# Patient Record
Sex: MALE | Race: WHITE | NOT HISPANIC OR LATINO
[De-identification: names, ages, dates, MRNs, and addresses within clinical notes are randomized per-mention and may not be internally consistent; named-entity substitution may affect disease eponyms.]

---

## 2017-07-10 PROBLEM — Z00.00 ENCOUNTER FOR PREVENTIVE HEALTH EXAMINATION: Status: ACTIVE | Noted: 2017-07-10

## 2017-08-08 ENCOUNTER — APPOINTMENT (OUTPATIENT)
Dept: NEPHROLOGY | Facility: CLINIC | Age: 37
End: 2017-08-08
Payer: COMMERCIAL

## 2017-08-08 VITALS
OXYGEN SATURATION: 98 % | WEIGHT: 159.2 LBS | BODY MASS INDEX: 25.58 KG/M2 | DIASTOLIC BLOOD PRESSURE: 81 MMHG | SYSTOLIC BLOOD PRESSURE: 117 MMHG | HEIGHT: 66 IN | HEART RATE: 67 BPM

## 2017-08-08 DIAGNOSIS — Z87.09 PERSONAL HISTORY OF OTHER DISEASES OF THE RESPIRATORY SYSTEM: ICD-10-CM

## 2017-08-08 DIAGNOSIS — N02.8 RECURRENT AND PERSISTENT HEMATURIA WITH OTHER MORPHOLOGIC CHANGES: ICD-10-CM

## 2017-08-08 DIAGNOSIS — Z83.49 FAMILY HISTORY OF OTHER ENDOCRINE, NUTRITIONAL AND METABOLIC DISEASES: ICD-10-CM

## 2017-08-08 DIAGNOSIS — I10 ESSENTIAL (PRIMARY) HYPERTENSION: ICD-10-CM

## 2017-08-08 DIAGNOSIS — R80.9 PROTEINURIA, UNSPECIFIED: ICD-10-CM

## 2017-08-08 DIAGNOSIS — Z87.2 PERSONAL HISTORY OF DISEASES OF THE SKIN AND SUBCUTANEOUS TISSUE: ICD-10-CM

## 2017-08-08 PROCEDURE — 99245 OFF/OP CONSLTJ NEW/EST HI 55: CPT

## 2017-08-09 PROBLEM — Z87.09 HISTORY OF ASTHMA: Status: RESOLVED | Noted: 2017-08-09 | Resolved: 2017-08-09

## 2017-08-09 PROBLEM — I10 BENIGN ESSENTIAL HYPERTENSION: Status: ACTIVE | Noted: 2017-08-09

## 2017-08-09 PROBLEM — Z87.2 HISTORY OF ECZEMA: Status: RESOLVED | Noted: 2017-08-09 | Resolved: 2017-08-09

## 2017-08-09 PROBLEM — Z83.49 FAMILY HISTORY OF THYROID DISEASE: Status: ACTIVE | Noted: 2017-08-09

## 2017-08-09 PROBLEM — N02.8 IGA NEPHROPATHY: Status: ACTIVE | Noted: 2017-08-09

## 2017-08-09 PROBLEM — R80.9 PROTEINURIA: Status: ACTIVE | Noted: 2017-08-09

## 2017-08-10 LAB
APPEARANCE: CLEAR
BACTERIA: NEGATIVE
BILIRUBIN URINE: NEGATIVE
BLOOD URINE: ABNORMAL
COLOR: YELLOW
CREAT SPEC-SCNC: 20 MG/DL
CREAT/PROT UR: 0.5 RATIO
GLUCOSE QUALITATIVE U: NORMAL MG/DL
KETONES URINE: NEGATIVE
LEUKOCYTE ESTERASE URINE: NEGATIVE
MICROSCOPIC-UA: NORMAL
NITRITE URINE: NEGATIVE
PH URINE: 7
PROT UR-MCNC: 9 MG/DL
PROTEIN URINE: NEGATIVE MG/DL
RED BLOOD CELLS URINE: 0 /HPF
SPECIFIC GRAVITY URINE: 1.01
SQUAMOUS EPITHELIAL CELLS: 0 /HPF
UROBILINOGEN URINE: NORMAL MG/DL
WHITE BLOOD CELLS URINE: 0 /HPF

## 2017-08-15 ENCOUNTER — RESULT REVIEW (OUTPATIENT)
Age: 37
End: 2017-08-15

## 2020-08-24 ENCOUNTER — APPOINTMENT (OUTPATIENT)
Dept: SPINE | Facility: CLINIC | Age: 40
End: 2020-08-24
Payer: COMMERCIAL

## 2020-08-24 VITALS
RESPIRATION RATE: 18 BRPM | BODY MASS INDEX: 1.6 KG/M2 | TEMPERATURE: 97 F | HEIGHT: 66 IN | HEART RATE: 89 BPM | WEIGHT: 9.94 LBS | OXYGEN SATURATION: 98 % | SYSTOLIC BLOOD PRESSURE: 120 MMHG | DIASTOLIC BLOOD PRESSURE: 70 MMHG

## 2020-08-24 DIAGNOSIS — Z87.39 PERSONAL HISTORY OF OTHER DISEASES OF THE MUSCULOSKELETAL SYSTEM AND CONNECTIVE TISSUE: ICD-10-CM

## 2020-08-24 DIAGNOSIS — Z82.69 FAMILY HISTORY OF OTHER DISEASES OF THE MUSCULOSKELETAL SYSTEM AND CONNECTIVE TISSUE: ICD-10-CM

## 2020-08-24 DIAGNOSIS — Z78.9 OTHER SPECIFIED HEALTH STATUS: ICD-10-CM

## 2020-08-24 PROCEDURE — 99203 OFFICE O/P NEW LOW 30 MIN: CPT

## 2020-08-24 RX ORDER — SPIRONOLACTONE 50 MG/1
TABLET ORAL
Refills: 0 | Status: ACTIVE | COMMUNITY

## 2020-08-24 RX ORDER — LAMOTRIGINE 150 MG/1
150 TABLET ORAL
Refills: 0 | Status: ACTIVE | COMMUNITY

## 2020-08-24 NOTE — PHYSICAL EXAM
[Place] : oriented to place [Time] : oriented to time [Person] : oriented to person [Remote Intact] : remote memory intact [Short Term Intact] : short term memory intact [Span Intact] : the attention span was normal [Fluency] : fluency intact [Concentration Intact] : normal concentrating ability [Comprehension] : comprehension intact [Past History] : adequate knowledge of personal past history [Vocabulary] : adequate range of vocabulary [Current Events] : adequate knowledge of current events [Cranial Nerves Trigeminal (V)] : facial sensation intact symmetrically [Cranial Nerves Oculomotor (III)] : extraocular motion intact [Cranial Nerves Optic (II)] : visual acuity intact bilaterally,  pupils equal round and reactive to light [Cranial Nerves Vestibulocochlear (VIII)] : hearing was intact bilaterally [Cranial Nerves Facial (VII)] : face symmetrical [Cranial Nerves Accessory (XI - Cranial And Spinal)] : head turning and shoulder shrug symmetric [Cranial Nerves Hypoglossal (XII)] : there was no tongue deviation with protrusion [Cranial Nerves Glossopharyngeal (IX)] : tongue and palate midline [No Muscle Atrophy] : normal bulk in all four extremities [Motor Strength] : muscle strength was normal in all four extremities [Motor Tone] : muscle tone was normal in all four extremities [Sensation Tactile Decrease] : light touch was intact [Abnormal Walk] : normal gait [Balance] : balance was intact [Tremor] : no tremor present [Past-pointing] : there was no past-pointing [1+] : Ankle jerk left 1+ [2+] : Patella left 2+ [Barbosa] : Barbosa's sign was not demonstrated [Straight-Leg Raise Test - Left] : straight leg raise of the left leg was positive [Able to heel walk] : the patient was able to heel walk [Straight-Leg Raise Test - Right] : straight leg raise  of the right leg was negative [Able to toe walk] : the patient was able to toe walk

## 2020-08-24 NOTE — REASON FOR VISIT
[New Patient Visit] : a new patient visit [Other: _____] : [unfilled] [Referred By: _________] : Patient was referred by JANE

## 2020-08-24 NOTE — HISTORY OF PRESENT ILLNESS
[> 3 months] : more  than 3 months [FreeTextEntry1] : Lumbar radiculopathy  [de-identified] : 40 year old male with lower back herniated discs diagnosed about five years ago and in 2018 the pain began to increase.  Over the last six months his pain is progressing.  He took prednisone and did attend PT prior to COVID pandemic and he had to stop PT.  A total of 5 weeks of physical therapy.   He has severe pain and difficulty sleeping.  He has left sided electric shocks into his back and then  muscle spasms from his left side of his buttock and left thigh posteriorly.  He has burning of his thigh and numbness of his left great toe.  He does have trouble walking only when he rises from laying down and transitioning.  He has no weakness.   He did have one epidural injection that was self limiting. An MRI report describes a left L4-5 and a left L5-S1 disc herniation. The CD contains no images.

## 2020-08-24 NOTE — ASSESSMENT
[FreeTextEntry1] : 40 year old male with a history of lower back pain and left thigh pain with known lumbar herniated disc.  He will undergo a trial of epidural injections and return if the pain is not relieved.   The MRI disc had no DICOM images and he will obtain an updated MRI if his pain is not alleviated by the epidural injections.

## 2020-08-27 PROBLEM — M54.16 LUMBAR RADICULOPATHY: Status: ACTIVE | Noted: 2020-08-24

## 2020-08-30 ENCOUNTER — TRANSCRIPTION ENCOUNTER (OUTPATIENT)
Age: 40
End: 2020-08-30

## 2020-08-30 ENCOUNTER — INPATIENT (INPATIENT)
Facility: HOSPITAL | Age: 40
LOS: 1 days | Discharge: HOME | DRG: 519 | End: 2020-09-01
Attending: NEUROLOGICAL SURGERY | Admitting: INTERNAL MEDICINE
Payer: COMMERCIAL

## 2020-08-30 VITALS
OXYGEN SATURATION: 96 % | TEMPERATURE: 98 F | RESPIRATION RATE: 18 BRPM | HEIGHT: 66 IN | WEIGHT: 149.91 LBS | SYSTOLIC BLOOD PRESSURE: 124 MMHG | HEART RATE: 114 BPM | DIASTOLIC BLOOD PRESSURE: 85 MMHG

## 2020-08-30 DIAGNOSIS — M54.9 DORSALGIA, UNSPECIFIED: ICD-10-CM

## 2020-08-30 LAB
ALBUMIN SERPL ELPH-MCNC: 4.3 G/DL — SIGNIFICANT CHANGE UP (ref 3.3–5)
ALP SERPL-CCNC: 46 U/L — SIGNIFICANT CHANGE UP (ref 40–120)
ALT FLD-CCNC: 18 U/L — SIGNIFICANT CHANGE UP (ref 10–45)
ANION GAP SERPL CALC-SCNC: 11 MMOL/L — SIGNIFICANT CHANGE UP (ref 5–17)
APPEARANCE UR: CLEAR — SIGNIFICANT CHANGE UP
AST SERPL-CCNC: 16 U/L — SIGNIFICANT CHANGE UP (ref 10–40)
BACTERIA # UR AUTO: NEGATIVE — SIGNIFICANT CHANGE UP
BASOPHILS # BLD AUTO: 0.01 K/UL — SIGNIFICANT CHANGE UP (ref 0–0.2)
BASOPHILS NFR BLD AUTO: 0.1 % — SIGNIFICANT CHANGE UP (ref 0–2)
BILIRUB SERPL-MCNC: 0.9 MG/DL — SIGNIFICANT CHANGE UP (ref 0.2–1.2)
BILIRUB UR-MCNC: NEGATIVE — SIGNIFICANT CHANGE UP
BUN SERPL-MCNC: 14 MG/DL — SIGNIFICANT CHANGE UP (ref 7–23)
CALCIUM SERPL-MCNC: 9.3 MG/DL — SIGNIFICANT CHANGE UP (ref 8.4–10.5)
CHLORIDE SERPL-SCNC: 106 MMOL/L — SIGNIFICANT CHANGE UP (ref 96–108)
CO2 SERPL-SCNC: 25 MMOL/L — SIGNIFICANT CHANGE UP (ref 22–31)
COLOR SPEC: SIGNIFICANT CHANGE UP
CREAT SERPL-MCNC: 0.83 MG/DL — SIGNIFICANT CHANGE UP (ref 0.5–1.3)
DIFF PNL FLD: ABNORMAL
EOSINOPHIL # BLD AUTO: 0.03 K/UL — SIGNIFICANT CHANGE UP (ref 0–0.5)
EOSINOPHIL NFR BLD AUTO: 0.3 % — SIGNIFICANT CHANGE UP (ref 0–6)
EPI CELLS # UR: 0 /HPF — SIGNIFICANT CHANGE UP
GLUCOSE SERPL-MCNC: 130 MG/DL — HIGH (ref 70–99)
GLUCOSE UR QL: NEGATIVE — SIGNIFICANT CHANGE UP
HCT VFR BLD CALC: 45.6 % — SIGNIFICANT CHANGE UP (ref 39–50)
HGB BLD-MCNC: 15.1 G/DL — SIGNIFICANT CHANGE UP (ref 13–17)
HYALINE CASTS # UR AUTO: 3 /LPF — HIGH (ref 0–2)
IMM GRANULOCYTES NFR BLD AUTO: 0.3 % — SIGNIFICANT CHANGE UP (ref 0–1.5)
KETONES UR-MCNC: NEGATIVE — SIGNIFICANT CHANGE UP
LEUKOCYTE ESTERASE UR-ACNC: NEGATIVE — SIGNIFICANT CHANGE UP
LYMPHOCYTES # BLD AUTO: 1.08 K/UL — SIGNIFICANT CHANGE UP (ref 1–3.3)
LYMPHOCYTES # BLD AUTO: 12.6 % — LOW (ref 13–44)
MCHC RBC-ENTMCNC: 27.9 PG — SIGNIFICANT CHANGE UP (ref 27–34)
MCHC RBC-ENTMCNC: 33.1 GM/DL — SIGNIFICANT CHANGE UP (ref 32–36)
MCV RBC AUTO: 84.3 FL — SIGNIFICANT CHANGE UP (ref 80–100)
MONOCYTES # BLD AUTO: 0.38 K/UL — SIGNIFICANT CHANGE UP (ref 0–0.9)
MONOCYTES NFR BLD AUTO: 4.4 % — SIGNIFICANT CHANGE UP (ref 2–14)
NEUTROPHILS # BLD AUTO: 7.07 K/UL — SIGNIFICANT CHANGE UP (ref 1.8–7.4)
NEUTROPHILS NFR BLD AUTO: 82.3 % — HIGH (ref 43–77)
NITRITE UR-MCNC: NEGATIVE — SIGNIFICANT CHANGE UP
NRBC # BLD: 0 /100 WBCS — SIGNIFICANT CHANGE UP (ref 0–0)
PH UR: 6.5 — SIGNIFICANT CHANGE UP (ref 5–8)
PLATELET # BLD AUTO: 236 K/UL — SIGNIFICANT CHANGE UP (ref 150–400)
POTASSIUM SERPL-MCNC: 4 MMOL/L — SIGNIFICANT CHANGE UP (ref 3.5–5.3)
POTASSIUM SERPL-SCNC: 4 MMOL/L — SIGNIFICANT CHANGE UP (ref 3.5–5.3)
PROT SERPL-MCNC: 6.4 G/DL — SIGNIFICANT CHANGE UP (ref 6–8.3)
PROT UR-MCNC: 100 — SIGNIFICANT CHANGE UP
RBC # BLD: 5.41 M/UL — SIGNIFICANT CHANGE UP (ref 4.2–5.8)
RBC # FLD: 12.3 % — SIGNIFICANT CHANGE UP (ref 10.3–14.5)
RBC CASTS # UR COMP ASSIST: 29 /HPF — HIGH (ref 0–4)
SODIUM SERPL-SCNC: 142 MMOL/L — SIGNIFICANT CHANGE UP (ref 135–145)
SP GR SPEC: 1.02 — SIGNIFICANT CHANGE UP (ref 1.01–1.02)
UROBILINOGEN FLD QL: NEGATIVE — SIGNIFICANT CHANGE UP
WBC # BLD: 8.6 K/UL — SIGNIFICANT CHANGE UP (ref 3.8–10.5)
WBC # FLD AUTO: 8.6 K/UL — SIGNIFICANT CHANGE UP (ref 3.8–10.5)
WBC UR QL: 1 /HPF — SIGNIFICANT CHANGE UP (ref 0–5)

## 2020-08-30 PROCEDURE — 72148 MRI LUMBAR SPINE W/O DYE: CPT | Mod: 26

## 2020-08-30 PROCEDURE — 99223 1ST HOSP IP/OBS HIGH 75: CPT

## 2020-08-30 PROCEDURE — 99285 EMERGENCY DEPT VISIT HI MDM: CPT

## 2020-08-30 RX ORDER — OXYCODONE HYDROCHLORIDE 5 MG/1
5 TABLET ORAL EVERY 4 HOURS
Refills: 0 | Status: DISCONTINUED | OUTPATIENT
Start: 2020-08-30 | End: 2020-08-31

## 2020-08-30 RX ORDER — DEXAMETHASONE 0.5 MG/5ML
4 ELIXIR ORAL EVERY 6 HOURS
Refills: 0 | Status: DISCONTINUED | OUTPATIENT
Start: 2020-08-30 | End: 2020-08-31

## 2020-08-30 RX ORDER — GABAPENTIN 400 MG/1
300 CAPSULE ORAL
Refills: 0 | Status: DISCONTINUED | OUTPATIENT
Start: 2020-08-30 | End: 2020-08-31

## 2020-08-30 RX ORDER — ACETAMINOPHEN 500 MG
975 TABLET ORAL ONCE
Refills: 0 | Status: COMPLETED | OUTPATIENT
Start: 2020-08-30 | End: 2020-08-30

## 2020-08-30 RX ORDER — SODIUM CHLORIDE 9 MG/ML
1000 INJECTION INTRAMUSCULAR; INTRAVENOUS; SUBCUTANEOUS ONCE
Refills: 0 | Status: COMPLETED | OUTPATIENT
Start: 2020-08-30 | End: 2020-08-30

## 2020-08-30 RX ORDER — DIAZEPAM 5 MG
5 TABLET ORAL ONCE
Refills: 0 | Status: DISCONTINUED | OUTPATIENT
Start: 2020-08-30 | End: 2020-08-30

## 2020-08-30 RX ORDER — SODIUM CHLORIDE 9 MG/ML
1000 INJECTION INTRAMUSCULAR; INTRAVENOUS; SUBCUTANEOUS ONCE
Refills: 0 | Status: DISCONTINUED | OUTPATIENT
Start: 2020-08-30 | End: 2020-08-30

## 2020-08-30 RX ORDER — SENNA PLUS 8.6 MG/1
2 TABLET ORAL AT BEDTIME
Refills: 0 | Status: DISCONTINUED | OUTPATIENT
Start: 2020-08-30 | End: 2020-08-31

## 2020-08-30 RX ORDER — OXYCODONE HYDROCHLORIDE 5 MG/1
5 TABLET ORAL ONCE
Refills: 0 | Status: DISCONTINUED | OUTPATIENT
Start: 2020-08-30 | End: 2020-08-30

## 2020-08-30 RX ORDER — ACETAMINOPHEN 500 MG
650 TABLET ORAL EVERY 6 HOURS
Refills: 0 | Status: DISCONTINUED | OUTPATIENT
Start: 2020-08-30 | End: 2020-08-31

## 2020-08-30 RX ORDER — CYCLOBENZAPRINE HYDROCHLORIDE 10 MG/1
5 TABLET, FILM COATED ORAL THREE TIMES A DAY
Refills: 0 | Status: DISCONTINUED | OUTPATIENT
Start: 2020-08-30 | End: 2020-08-31

## 2020-08-30 RX ORDER — LIDOCAINE 4 G/100G
1 CREAM TOPICAL ONCE
Refills: 0 | Status: COMPLETED | OUTPATIENT
Start: 2020-08-30 | End: 2020-08-30

## 2020-08-30 RX ADMIN — SODIUM CHLORIDE 1000 MILLILITER(S): 9 INJECTION INTRAMUSCULAR; INTRAVENOUS; SUBCUTANEOUS at 19:51

## 2020-08-30 RX ADMIN — OXYCODONE HYDROCHLORIDE 5 MILLIGRAM(S): 5 TABLET ORAL at 16:39

## 2020-08-30 RX ADMIN — Medication 975 MILLIGRAM(S): at 16:39

## 2020-08-30 RX ADMIN — OXYCODONE HYDROCHLORIDE 5 MILLIGRAM(S): 5 TABLET ORAL at 16:41

## 2020-08-30 RX ADMIN — Medication 5 MILLIGRAM(S): at 16:39

## 2020-08-30 RX ADMIN — Medication 975 MILLIGRAM(S): at 16:41

## 2020-08-30 RX ADMIN — LIDOCAINE 1 PATCH: 4 CREAM TOPICAL at 16:39

## 2020-08-30 NOTE — ED ADULT NURSE REASSESSMENT NOTE - NS ED NURSE REASSESS COMMENT FT1
Pt received from ANDRAE Ann in [place]. Pt remains in the ED. Resting comfortably in bed. VSS. NAD. AOx4. Breathing unlabored and spontaneously, sating 96% on room air. S1 and S2 heard. No peripheral edema. Peripheral pulses strong bilaterally. Abdomen soft, nontender and nondistended. Positive bowel sounds in all 4 quadrants. Pt safety maintained. Awaiting dispo. Will continue to monitor.

## 2020-08-30 NOTE — ED ADULT NURSE REASSESSMENT NOTE - NS ED NURSE REASSESS COMMENT FT1
Pt remains in the ED. Resting comfortably in bed. A&Ox3. Breathing spontaneously and unlabored. NAD. Pt given food and drink. Pt safety maintained. Will continue to monitor. Awaiting dispo.

## 2020-08-30 NOTE — CONSULT NOTE ADULT - ASSESSMENT
Sandeep Gustafson    40M PMH IgA nephropathy presents due to 6 weeks of worsening LBP with radiation down the left posterior leg, L5 distribution. Denies numbness, B/B incontinence, retention. Unable to walk due to pain. Failed PT/Steroid injections/oral steroids. No AC/AP. MRI shows     Exam: AAOx3, 5/5 throughout except L HF 4+/5 pain limited. SILT. SLR negative B/L, Jed negative B/L, no back tenderness to palp, no riggs, no clonus, Patellar reflex 2+ B/L. Had upcoming outpt appointment with Dr. Sony Barnett.  -Pain control  -Outside MRI does not have diagnostic sequences uploaded. Will need repeat MRI  -CT L-spine  Plan to be discussed with attending.

## 2020-08-30 NOTE — ED PROVIDER NOTE - OBJECTIVE STATEMENT
41yo male pt with PMHx of IgA nephropathy, chronic lower back pain presents to ED with worsening left sided back  pain, radiating to left leg since yesterday. He took Gabapentin/ medrol dose pack without improvement and had upcoming outpt appointment with Dr. Cardoza Tufernandoday. Denies recent injury. Denies fever, chills, cough or congestion. Denies headache, dizziness or neck pain. Denies CP/SOB/ABD pain or N/V/D. Denies urinary or bowel problems. Denies sensory changes or weakness to extremities.

## 2020-08-30 NOTE — ED PROVIDER NOTE - CLINICAL SUMMARY MEDICAL DECISION MAKING FREE TEXT BOX
Attending MD Santana: 40M with ho lumbar radiculopathy from herniated disc since 2/2020 presenting with acute on chronic low back pain and left leg pain. No red flags by history or exam to suggest impending cauda equina syndrome or spinal cord compression. Symptoms referable to known L spine herniated disc. Patient has 2nd opinion with Dr. Cardoza in 2 days. No indication for urgent spinal imaging through ED. Will pursue PO analgesia, patient already on PO steroids from his neurologist.

## 2020-08-30 NOTE — H&P ADULT - NSHPPHYSICALEXAM_GEN_ALL_CORE
Vital Signs Last 24 Hrs  T(C): 36.4 (30 Aug 2020 23:27), Max: 36.7 (30 Aug 2020 15:39)  T(F): 97.6 (30 Aug 2020 23:27), Max: 98.1 (30 Aug 2020 16:43)  HR: 63 (30 Aug 2020 23:27) (59 - 114)  BP: 116/77 (30 Aug 2020 23:27) (108/79 - 128/81)  BP(mean): --  RR: 18 (30 Aug 2020 23:27) (18 - 18)  SpO2: 95% (30 Aug 2020 23:27) (95% - 99%) Vital Signs Last 24 Hrs  T(C): 36.4 (30 Aug 2020 23:27), Max: 36.7 (30 Aug 2020 15:39)  T(F): 97.6 (30 Aug 2020 23:27), Max: 98.1 (30 Aug 2020 16:43)  HR: 63 (30 Aug 2020 23:27) (59 - 114)  BP: 116/77 (30 Aug 2020 23:27) (108/79 - 128/81)  BP(mean): --  RR: 18 (30 Aug 2020 23:27) (18 - 18)  SpO2: 95% (30 Aug 2020 23:27) (95% - 99%)      PHYSICAL EXAM:  GENERAL: NAD, well-groomed, well-developed  HEAD:  Atraumatic, Normocephalic  EYES: EOMI, PERRLA, conjunctiva and sclera clear  ENMT: No tonsillar erythema, exudates, or enlargement; Moist mucous membranes, Good dentition, No lesions  NECK: Supple, No JVD, Normal thyroid  NERVOUS SYSTEM:  Alert & Oriented X3, Good concentration; Motor Strength 5/5 B/L upper extremities 4/5 strength at hip flexion of LLE, otherwise 5/5 throughout in B/L lower extremities. Sensation to light touch intact B/L  CHEST/LUNG: Clear to percussion bilaterally; No rales, rhonchi, or wheezing  HEART: Regular rate and rhythm +S1 S2; No murmurs, rubs, or gallops  ABDOMEN: Soft, Nontender, Nondistended; Bowel sounds present  EXTREMITIES:  2+ Radial and DP pulses B/L. No clubbing, cyanosis, or edema  SKIN: Warm and dry

## 2020-08-30 NOTE — H&P ADULT - NSHPREVIEWOFSYSTEMS_GEN_ALL_CORE
REVIEW OF SYSTEMS:  CONSTITUTIONAL: No fever, chills or sweats  EYES: No eye pain or discharge  ENMT: No sinus or throat pain  RESPIRATORY: No cough or shortness of breath  CARDIOVASCULAR: No chest pain or palpitations,  GASTROINTESTINAL: No abdominal pain. No nausea, vomiting, or hematemesis; No diarrhea or constipation.  GENITOURINARY: No dysuria or incontinence  NEUROLOGICAL: No headaches, loss of strength, numbness, or tremors  SKIN: No rashes, or lesions   MUSCULOSKELETAL: SEE HPI  PSYCHIATRIC: No depression or anxiety  ALLERGY AND IMMUNOLOGIC: No reactions to medications

## 2020-08-30 NOTE — ED PROVIDER NOTE - ATTENDING CONTRIBUTION TO CARE
Attending MD Santana:  I personally have seen and examined this patient.  NP note reviewed and agree on plan of care and except where noted.  See HPI, PE, and MDM for details.

## 2020-08-30 NOTE — H&P ADULT - ATTENDING COMMENTS
Dr. Nilsa Vazquez accepted patient's case from the ED and requested in house hospitalist team to complete admission. Patient was previously unknown to me. Patient was assigned to me by hospitalist in charge. My involvement in this case consisted only of the initial history, physical and management plan. Dr. Vazquez/Janie to assume care in AM and thereafter. Case discussed in detail with overnight medicine NP/PA Dr. Nilsa Vazquez accepted patient's case from the ED and requested in house hospitalist team to complete admission. Patient was previously unknown to me. Patient was assigned to me by hospitalist in charge. My involvement in this case consisted only of the initial history, physical and management plan. Dr. Vazquez/Janie to assume care in AM and thereafter. Case discussed in detail with overnight medicine NP/ROXANNE Tafoya [id number]

## 2020-08-30 NOTE — CONSULT NOTE ADULT - SUBJECTIVE AND OBJECTIVE BOX
p ([id number])     HPI:  40M PMH IgA nephropathy presents due to 6 weeks of worsening LBP with radiation down the left posterior leg, L5 distribution. Denies numbness, B/B incontinence, retention. Unable to walk due to pain. Failed PT/Steroid injections/oral steroids. No AC/AP. MRI shows     Exam: AAOx3, 5/5 throughout except L HF 4+/5 pain limited. SILT. SLR negative B/L, Jed negative B/L, no back tenderness to palp, no riggs, no clonus, Patellar reflex 2+ B/L. Had upcoming outpt appointment with Dr. Sony Barnett.    Imaging: Outside MRI without complete sequences    --Anticoagulation:    =====================  PAST MEDICAL HISTORY   IGA nephropathy    PAST SURGICAL HISTORY     NSAIDs (Other)      MEDICATIONS:  Antibiotics:    Neuro:    Other:      SOCIAL HISTORY:   Occupation:   Marital Status:     FAMILY HISTORY:      ROS: Negative except per HPI    LABS:

## 2020-08-30 NOTE — H&P ADULT - HISTORY OF PRESENT ILLNESS
41 yo man w/ PMH of IgA nephropathy, chronic lower back pain in setting of herniated disc presenting with worsening Lower back pain where he has been unable to ambulate of the past 2 nights. Patient states that the back pain predominantly located on the lower left side of his back with radiation down to the left calf. Describes the pain as a strong burning sensation. Pain is most pronounced when he stands. The past day and the half, he had to crawl to get around. Denies focal limb weakness, denies paresthesias or loss of sensation. Denies bowel or urinary incontinence He typically takes Gabapentin 300 qhs but he took 2 doses without alleviation. He called his neurologist and was prescribed a Medrol dose pack ant took 4 tablets today. Both of which did not provide alleviation of symptoms. Reportedly has tried PT, steroid injection (last 3 weeks ago) has not provided total relief in the past. Patient has been battling chronic back pain since 2018 and has occasional flares. His chronic back pain has been present since Feb 2020 and progressively worse. His neurologist recommended him to see Dr. Cardoza for possible surgical intervention.

## 2020-08-30 NOTE — H&P ADULT - ASSESSMENT
41 yo man w/ PMH of IgA nephropathy, chronic lower back pain in setting of herniated disc presenting with worsening Lower back pain where he has been unable to ambulate of the past 2 nights.

## 2020-08-30 NOTE — ED PROVIDER NOTE - PHYSICAL EXAMINATION
NAD, Tachycardia, Afebrile, Lungs clear. ABD soft, non tender. No spinal tender. No CVA tender. No focal neuro deficit.

## 2020-08-30 NOTE — ED ADULT NURSE NOTE - OBJECTIVE STATEMENT
39 yo male complaining of back pain "I have a herniated disk for years and here and there I feel a lot of pain, yesterday I was going up from bed and I felt the pain and I could not even stand, I have to crawl to the bathroom and even since then I am in so much pain" Pt states he cannot walk, alerted and oriented x3, no sings of acute distress noted. Pt laying on stretcher pending on MD examination.

## 2020-08-30 NOTE — H&P ADULT - PROBLEM SELECTOR PLAN 1
Discussed MRI w/[facility]uro[facility] on call: Reviewed MRI: notable disc herniation along sacral levels.   Recommended continued use of steroids Decadron 4mg q6h  Will follow up full read and to discuss plan with Dr. Cardoza  Continue pain control with Tylenol, Oxycodone prn, Increase Gabapentin to 300 BID and Trial of Cyclobenzaprine.   Primary day team to re-eval pain regimen and escalate if needed  Fall risk protocol  OOB with assistance

## 2020-08-30 NOTE — ED PROVIDER NOTE - PROGRESS NOTE DETAILS
Neurosurgery ar bedside. No focal neuro deficit now but he is unable to stand or ambulate due to right back spasm.

## 2020-08-31 ENCOUNTER — APPOINTMENT (OUTPATIENT)
Dept: SPINE | Facility: HOSPITAL | Age: 40
End: 2020-08-31
Payer: COMMERCIAL

## 2020-08-31 DIAGNOSIS — N02.8 RECURRENT AND PERSISTENT HEMATURIA WITH OTHER MORPHOLOGIC CHANGES: ICD-10-CM

## 2020-08-31 DIAGNOSIS — Z29.9 ENCOUNTER FOR PROPHYLACTIC MEASURES, UNSPECIFIED: ICD-10-CM

## 2020-08-31 DIAGNOSIS — M54.9 DORSALGIA, UNSPECIFIED: ICD-10-CM

## 2020-08-31 DIAGNOSIS — Z98.890 OTHER SPECIFIED POSTPROCEDURAL STATES: Chronic | ICD-10-CM

## 2020-08-31 LAB
ANION GAP SERPL CALC-SCNC: 10 MMOL/L — SIGNIFICANT CHANGE UP (ref 5–17)
ANION GAP SERPL CALC-SCNC: 16 MMOL/L — SIGNIFICANT CHANGE UP (ref 5–17)
APTT BLD: 27.9 SEC — SIGNIFICANT CHANGE UP (ref 27.5–35.5)
APTT BLD: 28.7 SEC — SIGNIFICANT CHANGE UP (ref 27.5–35.5)
BASOPHILS # BLD AUTO: 0.01 K/UL — SIGNIFICANT CHANGE UP (ref 0–0.2)
BASOPHILS # BLD AUTO: 0.01 K/UL — SIGNIFICANT CHANGE UP (ref 0–0.2)
BASOPHILS NFR BLD AUTO: 0.1 % — SIGNIFICANT CHANGE UP (ref 0–2)
BASOPHILS NFR BLD AUTO: 0.1 % — SIGNIFICANT CHANGE UP (ref 0–2)
BLD GP AB SCN SERPL QL: NEGATIVE — SIGNIFICANT CHANGE UP
BUN SERPL-MCNC: 13 MG/DL — SIGNIFICANT CHANGE UP (ref 7–23)
BUN SERPL-MCNC: 14 MG/DL — SIGNIFICANT CHANGE UP (ref 7–23)
CALCIUM SERPL-MCNC: 8.7 MG/DL — SIGNIFICANT CHANGE UP (ref 8.4–10.5)
CALCIUM SERPL-MCNC: 8.9 MG/DL — SIGNIFICANT CHANGE UP (ref 8.4–10.5)
CHLORIDE SERPL-SCNC: 103 MMOL/L — SIGNIFICANT CHANGE UP (ref 96–108)
CHLORIDE SERPL-SCNC: 106 MMOL/L — SIGNIFICANT CHANGE UP (ref 96–108)
CO2 SERPL-SCNC: 23 MMOL/L — SIGNIFICANT CHANGE UP (ref 22–31)
CO2 SERPL-SCNC: 24 MMOL/L — SIGNIFICANT CHANGE UP (ref 22–31)
CREAT SERPL-MCNC: 0.86 MG/DL — SIGNIFICANT CHANGE UP (ref 0.5–1.3)
CREAT SERPL-MCNC: 1 MG/DL — SIGNIFICANT CHANGE UP (ref 0.5–1.3)
EOSINOPHIL # BLD AUTO: 0.01 K/UL — SIGNIFICANT CHANGE UP (ref 0–0.5)
EOSINOPHIL # BLD AUTO: 0.05 K/UL — SIGNIFICANT CHANGE UP (ref 0–0.5)
EOSINOPHIL NFR BLD AUTO: 0.1 % — SIGNIFICANT CHANGE UP (ref 0–6)
EOSINOPHIL NFR BLD AUTO: 0.5 % — SIGNIFICANT CHANGE UP (ref 0–6)
GLUCOSE SERPL-MCNC: 118 MG/DL — HIGH (ref 70–99)
GLUCOSE SERPL-MCNC: 126 MG/DL — HIGH (ref 70–99)
HCT VFR BLD CALC: 44 % — SIGNIFICANT CHANGE UP (ref 39–50)
HCT VFR BLD CALC: 45.5 % — SIGNIFICANT CHANGE UP (ref 39–50)
HGB BLD-MCNC: 14.1 G/DL — SIGNIFICANT CHANGE UP (ref 13–17)
HGB BLD-MCNC: 14.4 G/DL — SIGNIFICANT CHANGE UP (ref 13–17)
IMM GRANULOCYTES NFR BLD AUTO: 0.3 % — SIGNIFICANT CHANGE UP (ref 0–1.5)
IMM GRANULOCYTES NFR BLD AUTO: 0.6 % — SIGNIFICANT CHANGE UP (ref 0–1.5)
INR BLD: 0.98 RATIO — SIGNIFICANT CHANGE UP (ref 0.88–1.16)
INR BLD: 0.99 RATIO — SIGNIFICANT CHANGE UP (ref 0.88–1.16)
LYMPHOCYTES # BLD AUTO: 1.04 K/UL — SIGNIFICANT CHANGE UP (ref 1–3.3)
LYMPHOCYTES # BLD AUTO: 1.24 K/UL — SIGNIFICANT CHANGE UP (ref 1–3.3)
LYMPHOCYTES # BLD AUTO: 12.8 % — LOW (ref 13–44)
LYMPHOCYTES # BLD AUTO: 9.7 % — LOW (ref 13–44)
MAGNESIUM SERPL-MCNC: 2 MG/DL — SIGNIFICANT CHANGE UP (ref 1.6–2.6)
MCHC RBC-ENTMCNC: 27.2 PG — SIGNIFICANT CHANGE UP (ref 27–34)
MCHC RBC-ENTMCNC: 27.3 PG — SIGNIFICANT CHANGE UP (ref 27–34)
MCHC RBC-ENTMCNC: 31.6 GM/DL — LOW (ref 32–36)
MCHC RBC-ENTMCNC: 32 GM/DL — SIGNIFICANT CHANGE UP (ref 32–36)
MCV RBC AUTO: 85.1 FL — SIGNIFICANT CHANGE UP (ref 80–100)
MCV RBC AUTO: 85.8 FL — SIGNIFICANT CHANGE UP (ref 80–100)
MONOCYTES # BLD AUTO: 0.18 K/UL — SIGNIFICANT CHANGE UP (ref 0–0.9)
MONOCYTES # BLD AUTO: 0.31 K/UL — SIGNIFICANT CHANGE UP (ref 0–0.9)
MONOCYTES NFR BLD AUTO: 1.7 % — LOW (ref 2–14)
MONOCYTES NFR BLD AUTO: 3.2 % — SIGNIFICANT CHANGE UP (ref 2–14)
NEUTROPHILS # BLD AUTO: 8.01 K/UL — HIGH (ref 1.8–7.4)
NEUTROPHILS # BLD AUTO: 9.45 K/UL — HIGH (ref 1.8–7.4)
NEUTROPHILS NFR BLD AUTO: 83.1 % — HIGH (ref 43–77)
NEUTROPHILS NFR BLD AUTO: 87.8 % — HIGH (ref 43–77)
NRBC # BLD: 0 /100 WBCS — SIGNIFICANT CHANGE UP (ref 0–0)
NRBC # BLD: 0 /100 WBCS — SIGNIFICANT CHANGE UP (ref 0–0)
PLATELET # BLD AUTO: 215 K/UL — SIGNIFICANT CHANGE UP (ref 150–400)
PLATELET # BLD AUTO: 217 K/UL — SIGNIFICANT CHANGE UP (ref 150–400)
POTASSIUM SERPL-MCNC: 4.1 MMOL/L — SIGNIFICANT CHANGE UP (ref 3.5–5.3)
POTASSIUM SERPL-MCNC: 4.2 MMOL/L — SIGNIFICANT CHANGE UP (ref 3.5–5.3)
POTASSIUM SERPL-SCNC: 4.1 MMOL/L — SIGNIFICANT CHANGE UP (ref 3.5–5.3)
POTASSIUM SERPL-SCNC: 4.2 MMOL/L — SIGNIFICANT CHANGE UP (ref 3.5–5.3)
PROTHROM AB SERPL-ACNC: 11.7 SEC — SIGNIFICANT CHANGE UP (ref 10.6–13.6)
PROTHROM AB SERPL-ACNC: 11.7 SEC — SIGNIFICANT CHANGE UP (ref 10.6–13.6)
RBC # BLD: 5.17 M/UL — SIGNIFICANT CHANGE UP (ref 4.2–5.8)
RBC # BLD: 5.3 M/UL — SIGNIFICANT CHANGE UP (ref 4.2–5.8)
RBC # FLD: 12.2 % — SIGNIFICANT CHANGE UP (ref 10.3–14.5)
RBC # FLD: 12.4 % — SIGNIFICANT CHANGE UP (ref 10.3–14.5)
RH IG SCN BLD-IMP: POSITIVE — SIGNIFICANT CHANGE UP
RH IG SCN BLD-IMP: POSITIVE — SIGNIFICANT CHANGE UP
SARS-COV-2 IGG SERPL QL IA: NEGATIVE — SIGNIFICANT CHANGE UP
SARS-COV-2 IGM SERPL IA-ACNC: <3.8 AU/ML — SIGNIFICANT CHANGE UP
SARS-COV-2 RNA SPEC QL NAA+PROBE: SIGNIFICANT CHANGE UP
SODIUM SERPL-SCNC: 140 MMOL/L — SIGNIFICANT CHANGE UP (ref 135–145)
SODIUM SERPL-SCNC: 142 MMOL/L — SIGNIFICANT CHANGE UP (ref 135–145)
WBC # BLD: 10.75 K/UL — HIGH (ref 3.8–10.5)
WBC # BLD: 9.65 K/UL — SIGNIFICANT CHANGE UP (ref 3.8–10.5)
WBC # FLD AUTO: 10.75 K/UL — HIGH (ref 3.8–10.5)
WBC # FLD AUTO: 9.65 K/UL — SIGNIFICANT CHANGE UP (ref 3.8–10.5)

## 2020-08-31 PROCEDURE — 63030 LAMOT DCMPRN NRV RT 1 LMBR: CPT | Mod: LT

## 2020-08-31 RX ORDER — DEXTROSE MONOHYDRATE, SODIUM CHLORIDE, AND POTASSIUM CHLORIDE 50; .745; 4.5 G/1000ML; G/1000ML; G/1000ML
1000 INJECTION, SOLUTION INTRAVENOUS
Refills: 0 | Status: DISCONTINUED | OUTPATIENT
Start: 2020-08-31 | End: 2020-08-31

## 2020-08-31 RX ORDER — LOSARTAN POTASSIUM 100 MG/1
100 TABLET, FILM COATED ORAL DAILY
Refills: 0 | Status: DISCONTINUED | OUTPATIENT
Start: 2020-08-31 | End: 2020-08-31

## 2020-08-31 RX ORDER — CEFAZOLIN SODIUM 1 G
2000 VIAL (EA) INJECTION EVERY 8 HOURS
Refills: 0 | Status: DISCONTINUED | OUTPATIENT
Start: 2020-08-31 | End: 2020-09-01

## 2020-08-31 RX ORDER — OXYCODONE HYDROCHLORIDE 5 MG/1
5 TABLET ORAL EVERY 4 HOURS
Refills: 0 | Status: DISCONTINUED | OUTPATIENT
Start: 2020-08-31 | End: 2020-08-31

## 2020-08-31 RX ORDER — LAMOTRIGINE 25 MG/1
100 TABLET, ORALLY DISINTEGRATING ORAL
Refills: 0 | Status: DISCONTINUED | OUTPATIENT
Start: 2020-08-31 | End: 2020-08-31

## 2020-08-31 RX ORDER — GABAPENTIN 400 MG/1
0 CAPSULE ORAL
Qty: 0 | Refills: 0 | DISCHARGE

## 2020-08-31 RX ORDER — OXYCODONE HYDROCHLORIDE 5 MG/1
5 TABLET ORAL EVERY 4 HOURS
Refills: 0 | Status: DISCONTINUED | OUTPATIENT
Start: 2020-08-31 | End: 2020-09-01

## 2020-08-31 RX ORDER — OXYCODONE AND ACETAMINOPHEN 5; 325 MG/1; MG/1
2 TABLET ORAL EVERY 6 HOURS
Refills: 0 | Status: DISCONTINUED | OUTPATIENT
Start: 2020-08-31 | End: 2020-08-31

## 2020-08-31 RX ORDER — GABAPENTIN 400 MG/1
300 CAPSULE ORAL
Refills: 0 | Status: DISCONTINUED | OUTPATIENT
Start: 2020-08-31 | End: 2020-09-01

## 2020-08-31 RX ORDER — ONDANSETRON 8 MG/1
4 TABLET, FILM COATED ORAL EVERY 6 HOURS
Refills: 0 | Status: DISCONTINUED | OUTPATIENT
Start: 2020-08-31 | End: 2020-08-31

## 2020-08-31 RX ORDER — SPIRONOLACTONE 25 MG/1
25 TABLET, FILM COATED ORAL DAILY
Refills: 0 | Status: DISCONTINUED | OUTPATIENT
Start: 2020-08-31 | End: 2020-08-31

## 2020-08-31 RX ORDER — SENNA PLUS 8.6 MG/1
2 TABLET ORAL AT BEDTIME
Refills: 0 | Status: DISCONTINUED | OUTPATIENT
Start: 2020-08-31 | End: 2020-09-01

## 2020-08-31 RX ORDER — SPIRONOLACTONE 25 MG/1
1 TABLET, FILM COATED ORAL
Qty: 0 | Refills: 0 | DISCHARGE

## 2020-08-31 RX ORDER — SPIRONOLACTONE 25 MG/1
25 TABLET, FILM COATED ORAL DAILY
Refills: 0 | Status: DISCONTINUED | OUTPATIENT
Start: 2020-08-31 | End: 2020-09-01

## 2020-08-31 RX ORDER — CYCLOBENZAPRINE HYDROCHLORIDE 10 MG/1
5 TABLET, FILM COATED ORAL THREE TIMES A DAY
Refills: 0 | Status: DISCONTINUED | OUTPATIENT
Start: 2020-08-31 | End: 2020-09-01

## 2020-08-31 RX ORDER — ENOXAPARIN SODIUM 100 MG/ML
40 INJECTION SUBCUTANEOUS DAILY
Refills: 0 | Status: DISCONTINUED | OUTPATIENT
Start: 2020-09-01 | End: 2020-09-01

## 2020-08-31 RX ORDER — SODIUM CHLORIDE 9 MG/ML
1000 INJECTION INTRAMUSCULAR; INTRAVENOUS; SUBCUTANEOUS
Refills: 0 | Status: DISCONTINUED | OUTPATIENT
Start: 2020-08-31 | End: 2020-09-01

## 2020-08-31 RX ORDER — LAMOTRIGINE 25 MG/1
1 TABLET, ORALLY DISINTEGRATING ORAL
Qty: 0 | Refills: 0 | DISCHARGE

## 2020-08-31 RX ORDER — SODIUM CHLORIDE 9 MG/ML
1000 INJECTION INTRAMUSCULAR; INTRAVENOUS; SUBCUTANEOUS
Refills: 0 | Status: DISCONTINUED | OUTPATIENT
Start: 2020-08-31 | End: 2020-08-31

## 2020-08-31 RX ORDER — ACETAMINOPHEN 500 MG
650 TABLET ORAL EVERY 6 HOURS
Refills: 0 | Status: DISCONTINUED | OUTPATIENT
Start: 2020-08-31 | End: 2020-08-31

## 2020-08-31 RX ORDER — OXYCODONE HYDROCHLORIDE 5 MG/1
10 TABLET ORAL EVERY 4 HOURS
Refills: 0 | Status: DISCONTINUED | OUTPATIENT
Start: 2020-08-31 | End: 2020-09-01

## 2020-08-31 RX ORDER — SENNA PLUS 8.6 MG/1
2 TABLET ORAL AT BEDTIME
Refills: 0 | Status: DISCONTINUED | OUTPATIENT
Start: 2020-08-31 | End: 2020-08-31

## 2020-08-31 RX ORDER — ENOXAPARIN SODIUM 100 MG/ML
40 INJECTION SUBCUTANEOUS DAILY
Refills: 0 | Status: DISCONTINUED | OUTPATIENT
Start: 2020-08-31 | End: 2020-08-31

## 2020-08-31 RX ORDER — OXYCODONE AND ACETAMINOPHEN 5; 325 MG/1; MG/1
1 TABLET ORAL EVERY 4 HOURS
Refills: 0 | Status: DISCONTINUED | OUTPATIENT
Start: 2020-08-31 | End: 2020-08-31

## 2020-08-31 RX ORDER — LAMOTRIGINE 25 MG/1
100 TABLET, ORALLY DISINTEGRATING ORAL
Refills: 0 | Status: DISCONTINUED | OUTPATIENT
Start: 2020-08-31 | End: 2020-09-01

## 2020-08-31 RX ORDER — HYDROMORPHONE HYDROCHLORIDE 2 MG/ML
0.25 INJECTION INTRAMUSCULAR; INTRAVENOUS; SUBCUTANEOUS
Refills: 0 | Status: DISCONTINUED | OUTPATIENT
Start: 2020-08-31 | End: 2020-08-31

## 2020-08-31 RX ORDER — IRBESARTAN 75 MG/1
1 TABLET ORAL
Qty: 0 | Refills: 0 | DISCHARGE

## 2020-08-31 RX ORDER — LOSARTAN POTASSIUM 100 MG/1
100 TABLET, FILM COATED ORAL DAILY
Refills: 0 | Status: DISCONTINUED | OUTPATIENT
Start: 2020-08-31 | End: 2020-09-01

## 2020-08-31 RX ORDER — DIAZEPAM 5 MG
5 TABLET ORAL EVERY 6 HOURS
Refills: 0 | Status: DISCONTINUED | OUTPATIENT
Start: 2020-08-31 | End: 2020-09-01

## 2020-08-31 RX ORDER — ACETAMINOPHEN 500 MG
650 TABLET ORAL EVERY 6 HOURS
Refills: 0 | Status: DISCONTINUED | OUTPATIENT
Start: 2020-08-31 | End: 2020-09-01

## 2020-08-31 RX ORDER — DEXAMETHASONE 0.5 MG/5ML
4 ELIXIR ORAL EVERY 6 HOURS
Refills: 0 | Status: DISCONTINUED | OUTPATIENT
Start: 2020-08-31 | End: 2020-09-01

## 2020-08-31 RX ADMIN — HYDROMORPHONE HYDROCHLORIDE 0.25 MILLIGRAM(S): 2 INJECTION INTRAMUSCULAR; INTRAVENOUS; SUBCUTANEOUS at 20:15

## 2020-08-31 RX ADMIN — SODIUM CHLORIDE 75 MILLILITER(S): 9 INJECTION INTRAMUSCULAR; INTRAVENOUS; SUBCUTANEOUS at 22:34

## 2020-08-31 RX ADMIN — GABAPENTIN 300 MILLIGRAM(S): 400 CAPSULE ORAL at 00:09

## 2020-08-31 RX ADMIN — LAMOTRIGINE 100 MILLIGRAM(S): 25 TABLET, ORALLY DISINTEGRATING ORAL at 01:02

## 2020-08-31 RX ADMIN — Medication 650 MILLIGRAM(S): at 00:40

## 2020-08-31 RX ADMIN — OXYCODONE HYDROCHLORIDE 5 MILLIGRAM(S): 5 TABLET ORAL at 14:07

## 2020-08-31 RX ADMIN — OXYCODONE HYDROCHLORIDE 5 MILLIGRAM(S): 5 TABLET ORAL at 06:00

## 2020-08-31 RX ADMIN — Medication 650 MILLIGRAM(S): at 06:51

## 2020-08-31 RX ADMIN — OXYCODONE HYDROCHLORIDE 5 MILLIGRAM(S): 5 TABLET ORAL at 00:40

## 2020-08-31 RX ADMIN — Medication 5 MILLIGRAM(S): at 22:34

## 2020-08-31 RX ADMIN — OXYCODONE HYDROCHLORIDE 5 MILLIGRAM(S): 5 TABLET ORAL at 13:37

## 2020-08-31 RX ADMIN — LIDOCAINE 1 PATCH: 4 CREAM TOPICAL at 05:33

## 2020-08-31 RX ADMIN — OXYCODONE HYDROCHLORIDE 5 MILLIGRAM(S): 5 TABLET ORAL at 10:01

## 2020-08-31 RX ADMIN — Medication 650 MILLIGRAM(S): at 07:21

## 2020-08-31 RX ADMIN — Medication 4 MILLIGRAM(S): at 13:02

## 2020-08-31 RX ADMIN — HYDROMORPHONE HYDROCHLORIDE 0.25 MILLIGRAM(S): 2 INJECTION INTRAMUSCULAR; INTRAVENOUS; SUBCUTANEOUS at 20:30

## 2020-08-31 RX ADMIN — Medication 4 MILLIGRAM(S): at 00:12

## 2020-08-31 RX ADMIN — SPIRONOLACTONE 25 MILLIGRAM(S): 25 TABLET, FILM COATED ORAL at 09:31

## 2020-08-31 RX ADMIN — GABAPENTIN 300 MILLIGRAM(S): 400 CAPSULE ORAL at 09:31

## 2020-08-31 RX ADMIN — Medication 650 MILLIGRAM(S): at 00:10

## 2020-08-31 RX ADMIN — OXYCODONE HYDROCHLORIDE 5 MILLIGRAM(S): 5 TABLET ORAL at 00:10

## 2020-08-31 RX ADMIN — OXYCODONE HYDROCHLORIDE 5 MILLIGRAM(S): 5 TABLET ORAL at 09:31

## 2020-08-31 RX ADMIN — Medication 4 MILLIGRAM(S): at 05:31

## 2020-08-31 RX ADMIN — HYDROMORPHONE HYDROCHLORIDE 0.25 MILLIGRAM(S): 2 INJECTION INTRAMUSCULAR; INTRAVENOUS; SUBCUTANEOUS at 20:00

## 2020-08-31 RX ADMIN — LAMOTRIGINE 100 MILLIGRAM(S): 25 TABLET, ORALLY DISINTEGRATING ORAL at 13:02

## 2020-08-31 RX ADMIN — OXYCODONE HYDROCHLORIDE 5 MILLIGRAM(S): 5 TABLET ORAL at 05:30

## 2020-08-31 NOTE — REASON FOR VISIT
[Follow-Up: _____] : a [unfilled] follow-up visit [FreeTextEntry1] : This is a 40-year-old man here for evaluation of his lumbar spine he was recently seen by Dr. Brian Moore

## 2020-08-31 NOTE — CHART NOTE - NSCHARTNOTEFT_GEN_A_CORE
As per Dr. Vazquez, patient medically stable and cleared for OR for herniated disc. Awaiting OR with Neuro[facility] today    Mary Lou Horton PA-C

## 2020-08-31 NOTE — PROGRESS NOTE ADULT - ASSESSMENT
Sandeep Gustafson    40M PMH IgA nephropathy presents due to 6 weeks of worsening LBP with radiation down the left posterior leg, L5 distribution. Denies numbness, B/B incontinence, retention. Unable to walk due to pain. Failed PT/Steroid injections/oral steroids. No AC/AP. MRI shows     Exam: AAOx3, 5/5 throughout except L HF 4+/5 pain limited. SILT. SLR negative B/L, Jed negative B/L, no back tenderness to palp, no riggs, no clonus, Patellar reflex 2+ B/L. Had upcoming outpt appointment with Dr. Sony Barnett.  -Pain control  - MRI done, large L4/5 R disc.   -CT L-spine  Plan to be discussed with attending, please document clearance, likely will be pre op for this week. Sandeep Gustafson    40M PMH IgA nephropathy presents due to 6 weeks of worsening LBP with radiation down the left posterior leg, L5 distribution. Denies numbness, B/B incontinence, retention. Unable to walk due to pain. Failed PT/Steroid injections/oral steroids. No AC/AP. MRI shows     Exam: AAOx3, 5/5 throughout except L HF 4+/5 pain limited. SILT. SLR negative B/L, Jed negative B/L, no back tenderness to palp, no riggs, no clonus, Patellar reflex 2+ B/L. Had upcoming outpt appointment with Dr. Sony Barnett.  -Pain control  - MRI done, large L4/5 R disc.   -CT L-spine  Plan to be discussed with attending, please document clearance, pre-op for today.

## 2020-08-31 NOTE — PRE-ANESTHESIA EVALUATION ADULT - NSANTHOSAYNRD_GEN_A_CORE
No. ZACKERY screening performed.  STOP BANG Legend: 0-2 = LOW Risk; 3-4 = INTERMEDIATE Risk; 5-8 = HIGH Risk

## 2020-09-01 ENCOUNTER — TRANSCRIPTION ENCOUNTER (OUTPATIENT)
Age: 40
End: 2020-09-01

## 2020-09-01 ENCOUNTER — APPOINTMENT (OUTPATIENT)
Dept: SPINE | Facility: CLINIC | Age: 40
End: 2020-09-01

## 2020-09-01 VITALS
DIASTOLIC BLOOD PRESSURE: 77 MMHG | SYSTOLIC BLOOD PRESSURE: 115 MMHG | OXYGEN SATURATION: 96 % | TEMPERATURE: 98 F | HEART RATE: 69 BPM | RESPIRATION RATE: 16 BRPM

## 2020-09-01 DIAGNOSIS — M54.16 RADICULOPATHY, LUMBAR REGION: ICD-10-CM

## 2020-09-01 PROBLEM — M54.9 DORSALGIA, UNSPECIFIED: Chronic | Status: ACTIVE | Noted: 2020-08-31

## 2020-09-01 PROBLEM — N02.8 RECURRENT AND PERSISTENT HEMATURIA WITH OTHER MORPHOLOGIC CHANGES: Chronic | Status: ACTIVE | Noted: 2020-08-31

## 2020-09-01 LAB
ANION GAP SERPL CALC-SCNC: 10 MMOL/L — SIGNIFICANT CHANGE UP (ref 5–17)
BASOPHILS # BLD AUTO: 0.01 K/UL — SIGNIFICANT CHANGE UP (ref 0–0.2)
BASOPHILS NFR BLD AUTO: 0.1 % — SIGNIFICANT CHANGE UP (ref 0–2)
BUN SERPL-MCNC: 15 MG/DL — SIGNIFICANT CHANGE UP (ref 7–23)
CALCIUM SERPL-MCNC: 8.7 MG/DL — SIGNIFICANT CHANGE UP (ref 8.4–10.5)
CHLORIDE SERPL-SCNC: 105 MMOL/L — SIGNIFICANT CHANGE UP (ref 96–108)
CO2 SERPL-SCNC: 26 MMOL/L — SIGNIFICANT CHANGE UP (ref 22–31)
CREAT SERPL-MCNC: 0.9 MG/DL — SIGNIFICANT CHANGE UP (ref 0.5–1.3)
EOSINOPHIL # BLD AUTO: 0 K/UL — SIGNIFICANT CHANGE UP (ref 0–0.5)
EOSINOPHIL NFR BLD AUTO: 0 % — SIGNIFICANT CHANGE UP (ref 0–6)
GLUCOSE SERPL-MCNC: 111 MG/DL — HIGH (ref 70–99)
HCT VFR BLD CALC: 41.8 % — SIGNIFICANT CHANGE UP (ref 39–50)
HCT VFR BLD CALC: 42 % — SIGNIFICANT CHANGE UP (ref 39–50)
HGB BLD-MCNC: 13.4 G/DL — SIGNIFICANT CHANGE UP (ref 13–17)
HGB BLD-MCNC: 13.5 G/DL — SIGNIFICANT CHANGE UP (ref 13–17)
IMM GRANULOCYTES NFR BLD AUTO: 0.5 % — SIGNIFICANT CHANGE UP (ref 0–1.5)
LYMPHOCYTES # BLD AUTO: 1.29 K/UL — SIGNIFICANT CHANGE UP (ref 1–3.3)
LYMPHOCYTES # BLD AUTO: 9.7 % — LOW (ref 13–44)
MAGNESIUM SERPL-MCNC: 2 MG/DL — SIGNIFICANT CHANGE UP (ref 1.6–2.6)
MCHC RBC-ENTMCNC: 27.2 PG — SIGNIFICANT CHANGE UP (ref 27–34)
MCHC RBC-ENTMCNC: 27.5 PG — SIGNIFICANT CHANGE UP (ref 27–34)
MCHC RBC-ENTMCNC: 31.9 GM/DL — LOW (ref 32–36)
MCHC RBC-ENTMCNC: 32.3 GM/DL — SIGNIFICANT CHANGE UP (ref 32–36)
MCV RBC AUTO: 85.1 FL — SIGNIFICANT CHANGE UP (ref 80–100)
MCV RBC AUTO: 85.4 FL — SIGNIFICANT CHANGE UP (ref 80–100)
MONOCYTES # BLD AUTO: 0.47 K/UL — SIGNIFICANT CHANGE UP (ref 0–0.9)
MONOCYTES NFR BLD AUTO: 3.5 % — SIGNIFICANT CHANGE UP (ref 2–14)
NEUTROPHILS # BLD AUTO: 11.46 K/UL — HIGH (ref 1.8–7.4)
NEUTROPHILS NFR BLD AUTO: 86.2 % — HIGH (ref 43–77)
NRBC # BLD: 0 /100 WBCS — SIGNIFICANT CHANGE UP (ref 0–0)
NRBC # BLD: 0 /100 WBCS — SIGNIFICANT CHANGE UP (ref 0–0)
PLATELET # BLD AUTO: 231 K/UL — SIGNIFICANT CHANGE UP (ref 150–400)
PLATELET # BLD AUTO: 242 K/UL — SIGNIFICANT CHANGE UP (ref 150–400)
POTASSIUM SERPL-MCNC: 4.1 MMOL/L — SIGNIFICANT CHANGE UP (ref 3.5–5.3)
POTASSIUM SERPL-SCNC: 4.1 MMOL/L — SIGNIFICANT CHANGE UP (ref 3.5–5.3)
RBC # BLD: 4.91 M/UL — SIGNIFICANT CHANGE UP (ref 4.2–5.8)
RBC # BLD: 4.92 M/UL — SIGNIFICANT CHANGE UP (ref 4.2–5.8)
RBC # FLD: 12.3 % — SIGNIFICANT CHANGE UP (ref 10.3–14.5)
RBC # FLD: 12.4 % — SIGNIFICANT CHANGE UP (ref 10.3–14.5)
SODIUM SERPL-SCNC: 141 MMOL/L — SIGNIFICANT CHANGE UP (ref 135–145)
WBC # BLD: 13.29 K/UL — HIGH (ref 3.8–10.5)
WBC # BLD: 13.81 K/UL — HIGH (ref 3.8–10.5)
WBC # FLD AUTO: 13.29 K/UL — HIGH (ref 3.8–10.5)
WBC # FLD AUTO: 13.81 K/UL — HIGH (ref 3.8–10.5)

## 2020-09-01 PROCEDURE — 80048 BASIC METABOLIC PNL TOTAL CA: CPT

## 2020-09-01 PROCEDURE — 80053 COMPREHEN METABOLIC PANEL: CPT

## 2020-09-01 PROCEDURE — 86769 SARS-COV-2 COVID-19 ANTIBODY: CPT

## 2020-09-01 PROCEDURE — 86901 BLOOD TYPING SEROLOGIC RH(D): CPT

## 2020-09-01 PROCEDURE — 97161 PT EVAL LOW COMPLEX 20 MIN: CPT

## 2020-09-01 PROCEDURE — U0003: CPT

## 2020-09-01 PROCEDURE — 85610 PROTHROMBIN TIME: CPT

## 2020-09-01 PROCEDURE — 86850 RBC ANTIBODY SCREEN: CPT

## 2020-09-01 PROCEDURE — 83735 ASSAY OF MAGNESIUM: CPT

## 2020-09-01 PROCEDURE — 76000 FLUOROSCOPY <1 HR PHYS/QHP: CPT

## 2020-09-01 PROCEDURE — 81001 URINALYSIS AUTO W/SCOPE: CPT

## 2020-09-01 PROCEDURE — 85730 THROMBOPLASTIN TIME PARTIAL: CPT

## 2020-09-01 PROCEDURE — C1889: CPT

## 2020-09-01 PROCEDURE — 85027 COMPLETE CBC AUTOMATED: CPT

## 2020-09-01 PROCEDURE — C9399: CPT

## 2020-09-01 PROCEDURE — 72148 MRI LUMBAR SPINE W/O DYE: CPT

## 2020-09-01 PROCEDURE — 86900 BLOOD TYPING SEROLOGIC ABO: CPT

## 2020-09-01 RX ORDER — SENNA PLUS 8.6 MG/1
2 TABLET ORAL
Qty: 0 | Refills: 0 | DISCHARGE
Start: 2020-09-01

## 2020-09-01 RX ORDER — TIZANIDINE 4 MG/1
2 TABLET ORAL
Qty: 0 | Refills: 0 | DISCHARGE

## 2020-09-01 RX ORDER — ACETAMINOPHEN 500 MG
2 TABLET ORAL
Qty: 0 | Refills: 0 | DISCHARGE
Start: 2020-09-01

## 2020-09-01 RX ORDER — DIAZEPAM 5 MG
5 TABLET ORAL EVERY 6 HOURS
Refills: 0 | Status: DISCONTINUED | OUTPATIENT
Start: 2020-09-01 | End: 2020-09-01

## 2020-09-01 RX ORDER — DIAZEPAM 5 MG
1 TABLET ORAL
Qty: 28 | Refills: 0
Start: 2020-09-01 | End: 2020-09-07

## 2020-09-01 RX ORDER — OXYCODONE HYDROCHLORIDE 5 MG/1
2 TABLET ORAL
Qty: 60 | Refills: 0
Start: 2020-09-01 | End: 2020-09-05

## 2020-09-01 RX ADMIN — OXYCODONE HYDROCHLORIDE 10 MILLIGRAM(S): 5 TABLET ORAL at 14:20

## 2020-09-01 RX ADMIN — LOSARTAN POTASSIUM 100 MILLIGRAM(S): 100 TABLET, FILM COATED ORAL at 05:10

## 2020-09-01 RX ADMIN — OXYCODONE HYDROCHLORIDE 10 MILLIGRAM(S): 5 TABLET ORAL at 10:00

## 2020-09-01 RX ADMIN — Medication 1 TABLET(S): at 12:16

## 2020-09-01 RX ADMIN — Medication 100 MILLIGRAM(S): at 09:26

## 2020-09-01 RX ADMIN — Medication 4 MILLIGRAM(S): at 00:12

## 2020-09-01 RX ADMIN — SPIRONOLACTONE 25 MILLIGRAM(S): 25 TABLET, FILM COATED ORAL at 05:15

## 2020-09-01 RX ADMIN — OXYCODONE HYDROCHLORIDE 10 MILLIGRAM(S): 5 TABLET ORAL at 09:25

## 2020-09-01 RX ADMIN — Medication 100 MILLIGRAM(S): at 02:00

## 2020-09-01 RX ADMIN — LAMOTRIGINE 100 MILLIGRAM(S): 25 TABLET, ORALLY DISINTEGRATING ORAL at 00:48

## 2020-09-01 RX ADMIN — CYCLOBENZAPRINE HYDROCHLORIDE 5 MILLIGRAM(S): 10 TABLET, FILM COATED ORAL at 12:16

## 2020-09-01 RX ADMIN — LAMOTRIGINE 100 MILLIGRAM(S): 25 TABLET, ORALLY DISINTEGRATING ORAL at 09:22

## 2020-09-01 RX ADMIN — OXYCODONE HYDROCHLORIDE 10 MILLIGRAM(S): 5 TABLET ORAL at 05:11

## 2020-09-01 RX ADMIN — GABAPENTIN 300 MILLIGRAM(S): 400 CAPSULE ORAL at 05:11

## 2020-09-01 RX ADMIN — Medication 4 MILLIGRAM(S): at 12:16

## 2020-09-01 RX ADMIN — OXYCODONE HYDROCHLORIDE 10 MILLIGRAM(S): 5 TABLET ORAL at 13:51

## 2020-09-01 RX ADMIN — Medication 4 MILLIGRAM(S): at 05:11

## 2020-09-01 NOTE — PROGRESS NOTE ADULT - SUBJECTIVE AND OBJECTIVE BOX
Patient was seen at bedside this am. Patient was feeling well, just c/o some soreness of incision site. Denied any numbness/tingling, cp, sob, n/v, or abd pain.    OVERNIGHT EVENTS:  - admitted to neurosurgery floor s/p L4-5 discectomy yesterday    Vital Signs Last 24 Hrs  T(C): 36.7 (01 Sep 2020 08:55), Max: 36.9 (31 Aug 2020 19:35)  T(F): 98.1 (01 Sep 2020 08:55), Max: 98.4 (31 Aug 2020 19:35)  HR: 61 (01 Sep 2020 08:55) (61 - 115)  BP: 127/82 (01 Sep 2020 08:55) (100/61 - 130/76)  BP(mean): --  RR: 16 (01 Sep 2020 08:55) (16 - 24)  SpO2: 96% (01 Sep 2020 08:55) (95% - 100%)    I&O's Detail    31 Aug 2020 07:  -  01 Sep 2020 07:00  --------------------------------------------------------  IN:    IV PiggyBack: 50 mL    Oral Fluid: 550 mL    sodium chloride 0.9%.: 190 mL  Total IN: 790 mL    OUT:    Voided: 1540 mL  Total OUT: 1540 mL    Total NET: -750 mL      01 Sep 2020 07:  -  01 Sep 2020 10:08  --------------------------------------------------------  IN:    Oral Fluid: 480 mL  Total IN: 480 mL    OUT:  Total OUT: 0 mL    Total NET: 480 mL        I&O's Summary    31 Aug 2020 07:  -  01 Sep 2020 07:00  --------------------------------------------------------  IN: 790 mL / OUT: 1540 mL / NET: -750 mL    01 Sep 2020 07:01  -  01 Sep 2020 10:08  --------------------------------------------------------  IN: 480 mL / OUT: 0 mL / NET: 480 mL        PHYSICAL EXAM:  Neurological:  Awake. alert, oriented to person, place, and date, PERRL, no facial asymmetry, speech clear and fluent, follow commands, no drift, moving all extremities 5/5 except Lt foot DF 4/5 (?h/o weakness per patient), slight decrease in sensation under Lt big toe (same from pre-op)  Cardiovascular: +s1, s2  Respiratory: clear to auscultation b/l  Gastrointestinal: soft, non-distended, non-tender  Extremities: warm, dry  Incision/Wound: incision site dressing C/D/I    LABS:                        13.4   13.29 )-----------( 242      ( 01 Sep 2020 06:44 )             42.0     09-    141  |  105  |  15  ----------------------------<  111<H>  4.1   |  26  |  0.90    Ca    8.7      01 Sep 2020 06:43  Mg     2.0         TPro  6.4  /  Alb  4.3  /  TBili  0.9  /  DBili  x   /  AST  16  /  ALT  18  /  AlkPhos  46  08-30    PT/INR - ( 31 Aug 2020 08:53 )   PT: 11.7 sec;   INR: 0.99 ratio         PTT - ( 31 Aug 2020 08:53 )  PTT:27.9 sec  Urinalysis Basic - ( 30 Aug 2020 22:03 )    Color: Light Yellow / Appearance: Clear / S.020 / pH: x  Gluc: x / Ketone: Negative  / Bili: Negative / Urobili: Negative   Blood: x / Protein: 100 / Nitrite: Negative   Leuk Esterase: Negative / RBC: 29 /hpf / WBC 1 /HPF   Sq Epi: x / Non Sq Epi: 0 /hpf / Bacteria: Negative          CAPILLARY BLOOD GLUCOSE          Drug Levels: [] N/A    CSF Analysis: [] N/A      Allergies    NSAIDs (Other)    Intolerances      MEDICATIONS:  Antibiotics:  ceFAZolin   IVPB 2000 milliGRAM(s) IV Intermittent every 8 hours    Neuro:  acetaminophen   Tablet .. 650 milliGRAM(s) Oral every 6 hours PRN  cyclobenzaprine 5 milliGRAM(s) Oral three times a day PRN  diazepam    Tablet 5 milliGRAM(s) Oral every 6 hours PRN  gabapentin 300 milliGRAM(s) Oral two times a day  lamoTRIgine 100 milliGRAM(s) Oral two times a day  oxyCODONE    IR 5 milliGRAM(s) Oral every 4 hours PRN  oxyCODONE    IR 10 milliGRAM(s) Oral every 4 hours PRN    Anticoagulation:  enoxaparin Injectable 40 milliGRAM(s) SubCutaneous daily    OTHER:  dexAMETHasone     Tablet 4 milliGRAM(s) Oral every 6 hours  losartan 100 milliGRAM(s) Oral daily  senna 2 Tablet(s) Oral at bedtime  spironolactone 25 milliGRAM(s) Oral daily    IVF:  multivitamin 1 Tablet(s) Oral daily  sodium chloride 0.9%. 1000 milliLiter(s) IV Continuous <Continuous>    CULTURES:    RADIOLOGY & ADDITIONAL TESTS:
Patient seen and examined at bedside.    --Anticoagulation--  enoxaparin Injectable 40 milliGRAM(s) SubCutaneous daily    T(C): 36.7 (08-31-20 @ 05:26), Max: 36.7 (08-30-20 @ 15:39)  HR: 61 (08-31-20 @ 05:26) (59 - 114)  BP: 106/69 (08-31-20 @ 05:26) (106/69 - 128/81)  RR: 18 (08-31-20 @ 05:26) (18 - 18)  SpO2: 96% (08-31-20 @ 05:26) (95% - 99%)  Wt(kg): --    Exam:  AAOx3, 5/5 throughout except L HF 4+/5 pain limited. SILT. SLR negative B/L, Jed negative B/L, no back tenderness to palp, no riggs, no clonus, Patellar reflex 2+ B/L.

## 2020-09-01 NOTE — PHYSICAL THERAPY INITIAL EVALUATION ADULT - ADDITIONAL COMMENTS
PTA pt was independent with functional mobility and ADLs. Pt lives in a  with his wife and 2 children (7 and 3) with 2 steps to enter and 2 inside to bedroom

## 2020-09-01 NOTE — DISCHARGE NOTE PROVIDER - CARE PROVIDER_API CALL
Diana Cardoza  NEUROSURGERY  [address] [place] [address], [address]  [place] [address]  Phone: [phone]  Fax: [phone]  Scheduled Appointment: 09/08/2020 09:45 AM

## 2020-09-01 NOTE — DISCHARGE NOTE PROVIDER - NSDCFUADDINST_GEN_ALL_CORE_FT
- Return to Emergency Department or contact your Neurosurgeon if any changes in mental status, severe headache, weakness, gait instability, seizures, numbness or tingling of extremities; difficulty swallowing; drainage or redness of wound, high fever, unrelenting vomiting; pain in legs; difficulty urinating or constipation.  - Do not take Aspirin or any Motrin/NSAIDS until checking with your Neurosurgeon.

## 2020-09-01 NOTE — DISCHARGE NOTE NURSING/CASE MANAGEMENT/SOCIAL WORK - PATIENT PORTAL LINK FT
You can access the FollowMyHealth Patient Portal offered by [facility] by registering at the following website: [email or web address] By joining [email or web address] FollowMyHealth portal, you will also be able to view your health information using other applications (apps) compatible with our system.

## 2020-09-01 NOTE — DISCHARGE NOTE PROVIDER - NSDCACTIVITY_GEN_ALL_CORE
Walking - Outdoors allowed/Walking - Indoors allowed/Do not make important decisions/Do not drive or operate machinery/No heavy lifting/straining

## 2020-09-01 NOTE — DISCHARGE NOTE PROVIDER - HOSPITAL COURSE
A 40 year old man with past medical history of IgA nephropathy, chronic lower back pain in setting of herniated disc presented with worsening Lower back pain where he has been unable to ambulate of the past 2 nights . Patient states that the back pain predominantly located on the lower left side of his back with radiation down to the left calf. Describes the pain as a strong burning sensation. Pain is most pronounced when he stands. The past day and the half, he had to crawl to get around. Denies focal limb weakness, denies paresthesias or loss of sensation. Denies bowel or urinary incontinence He typically takes Gabapentin 300 qhs but he took 2 doses without alleviation. He called his neurologist and was prescribed a Medrol dose pack ant took 4 tablets today. Both of which did not provide alleviation of symptoms. Reportedly has tried PT, steroid injection (last 3 weeks ago) has not provided total relief in the past. Patient has been battling chronic back pain since 2018 and has occasional flares. His chronic back pain has been present since Feb 2020 and progressively worse. His neurologist recommended him to see Dr. Cardoza for possible surgical intervention. A 40 year old man with past medical history of IgA nephropathy, chronic lower back pain in setting of herniated disc presented with worsening Lower back pain where he has been unable to ambulate of the past 2 nights on 8/30/2020. Patient underwent L4-5 lami discectomy on 8/31/2020. Postoperative labs were stable. Neuro exams stable.        Physical therapy evaluation was appreciated and recommended outpatient PT. Patient was medically cleared for discharge home on 9/1/2020.

## 2020-09-01 NOTE — DISCHARGE NOTE PROVIDER - NSDCFUSCHEDAPPT_GEN_ALL_CORE_FT
JESSICA TALBOT ; 09/08/2020 ; [facility] JESSICA TALBOT ; 09/08/2020 ; [facility] JESSICA TALBOT ; 09/08/2020 ; [facility]

## 2020-09-01 NOTE — PROGRESS NOTE ADULT - ASSESSMENT
HPI:  39 yo man w/ PMH of IgA nephropathy, chronic lower back pain in setting of herniated disc presenting with worsening Lower back pain where he has been unable to ambulate of the past 2 nights. Patient states that the back pain predominantly located on the lower left side of his back with radiation down to the left calf. Describes the pain as a strong burning sensation. Pain is most pronounced when he stands. The past day and the half, he had to crawl to get around. Denies focal limb weakness, denies paresthesias or loss of sensation. Denies bowel or urinary incontinence He typically takes Gabapentin 300 qhs but he took 2 doses without alleviation. He called his neurologist and was prescribed a Medrol dose pack ant took 4 tablets today. Both of which did not provide alleviation of symptoms. Reportedly has tried PT, steroid injection (last 3 weeks ago) has not provided total relief in the past. Patient has been battling chronic back pain since 2018 and has occasional flares. His chronic back pain has been present since Feb 2020 and progressively worse. His neurologist recommended him to see Dr. Cardoza for possible surgical intervention. (30 Aug 2020 22:51)    PROCEDURE: s/p L4-5 LAMI DISCECTOMY  POD#1    PLAN:  - neuro check Q4hrs  - pain control with prn oxycodone and tylenol  - valium prn and   Discharge planning:     Assessment:  Please Check When Present   []  GCS  E   V  M     Heart Failure: []Acute, [] acute on chronic , []chronic  Heart Failure:  [] Diastolic (HFpEF), [] Systolic (HFrEF), []Combined (HFpEF and HFrEF), [] RHF, [] Pulm HTN, [] Other    [] ANG, [] ATN, [] AIN, [] other  [] CKD1, [] CKD2, [] CKD 3, [] CKD 4, [] CKD 5, []ESRD    Encephalopathy: [] Metabolic, [] Hepatic, [] toxic, [] Neurological, [] Other    Abnormal Nurtitional Status: [] malnurtition (see nutrition note), [ ]underweight: BMI < 19, [] morbid obesity: BMI >40, [] Cachexia    [] Sepsis  [] hypovolemic shock,[] cardiogenic shock, [] hemorrhagic shock, [] neuogenic shock  [] Acute Respiratory Failure  []Cerebral edema, [] Brain compression/ herniation,   [] Functional quadriplegia  [] Acute blood loss anemia HPI:  39 yo man w/ PMH of IgA nephropathy, chronic lower back pain in setting of herniated disc presenting with worsening Lower back pain where he has been unable to ambulate of the past 2 nights. Patient states that the back pain predominantly located on the lower left side of his back with radiation down to the left calf. Describes the pain as a strong burning sensation. Pain is most pronounced when he stands. The past day and the half, he had to crawl to get around. Denies focal limb weakness, denies paresthesias or loss of sensation. Denies bowel or urinary incontinence He typically takes Gabapentin 300 qhs but he took 2 doses without alleviation. He called his neurologist and was prescribed a Medrol dose pack ant took 4 tablets today. Both of which did not provide alleviation of symptoms. Reportedly has tried PT, steroid injection (last 3 weeks ago) has not provided total relief in the past. Patient has been battling chronic back pain since 2018 and has occasional flares. His chronic back pain has been present since Feb 2020 and progressively worse. His neurologist recommended him to see Dr. Cardoza for possible surgical intervention. (30 Aug 2020 22:51)    PROCEDURE: s/p L4-5 LAMI DISCECTOMY  POD#1    PLAN:  - neuro check Q4hrs  - pain control with prn oxycodone and tylenol  - valium prn and flexerile prn for muscle spasm  - continue decadron  - continue losartan and spironolactone for HTN  - post op H/H stable  - IVL given adequate PO intake  - senna for bowel regiments  - activity ambulate as tolerated  - incentive spirometer  - DVT ppx: SCDs and SQL  Discharge planning: PT-pending, likely d/c home today    will discuss plan with Dr. Cardoza    [facility] [id number]

## 2020-09-01 NOTE — DISCHARGE NOTE PROVIDER - NSDCCPCAREPLAN_GEN_ALL_CORE_FT
PRINCIPAL DISCHARGE DIAGNOSIS  Diagnosis: Intractable back pain  Assessment and Plan of Treatment: - You underwent L4-5 lami disectomy on 8/31.  - Please keep incision site clean and dry. Avoid rubbing. Pat dry if it gets wet. Please do not apply any shampoo, lotion, or cream on the incision site.  - You are scheduled to follow up with Dr. Cardoza at 9:45am on 9/8/2020.      SECONDARY DISCHARGE DIAGNOSES  Diagnosis: IgA nephropathy  Assessment and Plan of Treatment: - Your creatinine is stable post operatively. You can continue your home losartan and spironolactone.  - Please follow up with your primary care physician and your renal doctor within 1-2 weeks after discharge. PRINCIPAL DISCHARGE DIAGNOSIS  Diagnosis: Intractable back pain  Assessment and Plan of Treatment: - You underwent L4-5 lami disectomy on 8/31.  - Please keep incision site clean and dry. Avoid rubbing. Pat dry if it gets wet. Please do not apply any shampoo, lotion, or cream on the incision site.  - You are scheduled to follow up with Dr. Cardoza at 9:45am on 9/8/2020.      SECONDARY DISCHARGE DIAGNOSES  Diagnosis: IgA nephropathy  Assessment and Plan of Treatment: - Your creatinine is stable post operatively. You can continue your home irbesartan and spironolactone.  - Please follow up with your primary care physician and your renal doctor within 1-2 weeks after discharge.

## 2020-09-01 NOTE — PHYSICAL THERAPY INITIAL EVALUATION ADULT - PERTINENT HX OF CURRENT PROBLEM, REHAB EVAL
Pt is a 40M PMH IgA nephropathy presents due to 6 weeks of worsening LBP with radiation down the left posterior leg, L5 distribution. Denies numbness, B/B incontinence, retention. Unable to walk due to pain. Failed PT/Steroid injections/oral steroids. Pt is now s/p L4/5 laminectomy discectomy.

## 2020-09-01 NOTE — DISCHARGE NOTE PROVIDER - NSDCMRMEDTOKEN_GEN_ALL_CORE_FT
gabapentin 300 mg oral tablet: orally once a day (at bedtime)  irbesartan 300 mg oral tablet: 1 tab(s) orally once a day  lamoTRIgine 100 mg oral tablet: 1 tab(s) orally 2 times a day  Medrol Dosepak 4 mg oral tablet:   spironolactone 25 mg oral tablet: 1 tab(s) orally once a day  tiZANidine 4 mg oral tablet: 2 tab(s) orally once a day (at bedtime) acetaminophen 325 mg oral tablet: 2 tab(s) orally every 6 hours, As needed, Temp greater or equal to 38C (100.4F), Mild Pain (1 - 3)  gabapentin 300 mg oral tablet: orally once a day (at bedtime)  irbesartan 300 mg oral tablet: 1 tab(s) orally once a day  lamoTRIgine 100 mg oral tablet: 1 tab(s) orally 2 times a day  Medrol Dosepak 4 mg oral tablet:   Multiple Vitamins oral tablet: 1 tab(s) orally once a day  senna oral tablet: 2 tab(s) orally once a day (at bedtime)  spironolactone 25 mg oral tablet: 1 tab(s) orally once a day  tiZANidine 4 mg oral tablet: 2 tab(s) orally once a day (at bedtime) acetaminophen 325 mg oral tablet: 2 tab(s) orally every 6 hours, As needed, Temp greater or equal to 38C (100.4F), Mild Pain (1 - 3)  diazePAM 5 mg oral tablet: 1 tab(s) orally every 6 hours, As needed, spasm MDD:4 tablets  gabapentin 300 mg oral tablet: orally once a day (at bedtime)  irbesartan 300 mg oral tablet: 1 tab(s) orally once a day  lamoTRIgine 100 mg oral tablet: 1 tab(s) orally 2 times a day  Medrol 4 mg oral tablet: 1 tab(s) orally 4 times a day; please take as instructed on package  Multiple Vitamins oral tablet: 1 tab(s) orally once a day  outpatient physical therapy after spine surgery:   oxyCODONE 5 mg oral tablet: please take 1-2 tab(s) orally every 4 to 6 hours, As Needed for moderate to severe Pain MDD:12 tabs  senna oral tablet: 2 tab(s) orally once a day (at bedtime)  spironolactone 25 mg oral tablet: 1 tab(s) orally once a day

## 2020-09-08 ENCOUNTER — APPOINTMENT (OUTPATIENT)
Dept: SPINE | Facility: CLINIC | Age: 40
End: 2020-09-08
Payer: COMMERCIAL

## 2020-09-08 VITALS
HEART RATE: 88 BPM | TEMPERATURE: 97.6 F | SYSTOLIC BLOOD PRESSURE: 105 MMHG | BODY MASS INDEX: 24.11 KG/M2 | WEIGHT: 150 LBS | HEIGHT: 66 IN | DIASTOLIC BLOOD PRESSURE: 71 MMHG

## 2020-09-08 DIAGNOSIS — M62.830 MUSCLE SPASM OF BACK: ICD-10-CM

## 2020-09-08 PROCEDURE — 99024 POSTOP FOLLOW-UP VISIT: CPT

## 2020-09-08 RX ORDER — DIAZEPAM 5 MG/1
TABLET ORAL
Refills: 0 | Status: ACTIVE | COMMUNITY

## 2020-09-08 NOTE — PHYSICAL EXAM
[General Appearance - Alert] : alert [Longitudinal] : longitudinal [Staple Intact] : closed with intact staples [General Appearance - In No Acute Distress] : in no acute distress [Normal Skin] : normal [No Drainage] : without drainage [Cranial Nerves Oculomotor (III)] : extraocular motion intact [Cranial Nerves Optic (II)] : visual acuity intact bilaterally,  pupils equal round and reactive to light [Cranial Nerves Glossopharyngeal (IX)] : tongue and palate midline [Cranial Nerves Facial (VII)] : face symmetrical [Cranial Nerves Vestibulocochlear (VIII)] : hearing was intact bilaterally [Cranial Nerves Trigeminal (V)] : facial sensation intact symmetrically [Cranial Nerves Accessory (XI - Cranial And Spinal)] : head turning and shoulder shrug symmetric [Cranial Nerves Hypoglossal (XII)] : there was no tongue deviation with protrusion [Motor Strength] : muscle strength was normal in all four extremities [Motor Tone] : muscle tone was normal in all four extremities [Involuntary Movements] : no involuntary movements were seen [Balance] : balance was intact [Sensation Tactile Decrease] : light touch was intact [Abnormal Walk] : normal gait [Sclera] : the sclera and conjunctiva were normal [] : no respiratory distress [Respiration, Rhythm And Depth] : normal respiratory rhythm and effort [FreeTextEntry1] : Ambulates with cane for balance [Skin Color & Pigmentation] : normal skin color and pigmentation

## 2020-09-08 NOTE — HISTORY OF PRESENT ILLNESS
[FreeTextEntry1] : Hospital Course: \par Discharge Date	01-Sep-2020 \par Admission Date	30-Aug-2020 21:05 \par Reason for Admission	acute on chronic back pain \par Hospital Course	 \par A 40 year old man with past medical history of IgA nephropathy, chronic lower \par back pain in setting of herniated disc presented with worsening Lower back pain \par where he has been unable to ambulate of the past 2 nights on 8/30/2020. Patient \par underwent L4-5 lami discectomy on 8/31/2020. Postoperative labs were stable. \par Neuro exams stable. \par

## 2020-09-08 NOTE — REASON FOR VISIT
[de-identified] : He is doing well postoperatively and slowly integrating back into normal activities.\par He ambulates with a cane.  Pain is well controlled with oxycodone and Valium at this time.\par Reports resolve radicular pain in his lower extremity\par Prove motor strength is well\par \par Incision with approximated wound edges no signs of infection\par Aquacel dressing removed.\par Will return for staple removal in a few days [de-identified] : 8/31/20 [de-identified] : S/PLeft L4L5 hemilaminotomy.\par  Left L4L5 micro diskectomy.\par  Left L4L5 partial medial facetectomy.\par \par

## 2020-09-10 ENCOUNTER — APPOINTMENT (OUTPATIENT)
Dept: SPINE | Facility: CLINIC | Age: 40
End: 2020-09-10
Payer: COMMERCIAL

## 2020-09-10 PROCEDURE — 99024 POSTOP FOLLOW-UP VISIT: CPT

## 2020-09-10 RX ORDER — CYCLOBENZAPRINE HYDROCHLORIDE 10 MG/1
10 TABLET, FILM COATED ORAL
Qty: 30 | Refills: 0 | Status: DISCONTINUED | COMMUNITY
Start: 2020-06-29

## 2020-09-10 RX ORDER — METHYLPREDNISOLONE 4 MG/1
4 TABLET ORAL
Qty: 21 | Refills: 0 | Status: DISCONTINUED | COMMUNITY
Start: 2020-09-01

## 2020-09-10 RX ORDER — LAMOTRIGINE 100 MG/1
100 TABLET ORAL
Qty: 60 | Refills: 0 | Status: ACTIVE | COMMUNITY
Start: 2020-05-01

## 2020-09-10 RX ORDER — KETOCONAZOLE 20 MG/G
2 CREAM TOPICAL
Qty: 30 | Refills: 0 | Status: ACTIVE | COMMUNITY
Start: 2020-06-29

## 2020-09-10 RX ORDER — CYCLOBENZAPRINE HYDROCHLORIDE 5 MG/1
5 TABLET, FILM COATED ORAL
Qty: 21 | Refills: 0 | Status: DISCONTINUED | COMMUNITY
Start: 2020-09-08 | End: 2020-09-10

## 2020-09-10 RX ORDER — IRBESARTAN 300 MG/1
300 TABLET ORAL
Qty: 90 | Refills: 0 | Status: ACTIVE | COMMUNITY
Start: 2019-10-24

## 2020-09-10 RX ORDER — LORAZEPAM 0.5 MG/1
0.5 TABLET ORAL
Qty: 45 | Refills: 0 | Status: ACTIVE | COMMUNITY
Start: 2020-04-08

## 2020-09-10 NOTE — REASON FOR VISIT
[de-identified] : S/PLeft L4L5 hemilaminotomy.\par  Left L4L5 micro diskectomy.\par  Left L4L5 partial medial facetectomy.\par \par  [de-identified] : 8/31/20 [de-identified] : Here today for removal of staples\par Postop incision with approximated wound edges no signs of infections noted.\par Ambulating independently without a cane\par Reports improved pain syndrome

## 2020-09-10 NOTE — ASSESSMENT
[FreeTextEntry1] : 40-year-old man status post lumbar discectomy and laminectomy\par \par Doing well no signs of infection at incision site\par Staples removed without difficulty\par \par Follow-up with Dr. Cardoza  in 3 weeks\par

## 2020-09-28 PROBLEM — G89.18 POSTOPERATIVE PAIN: Status: ACTIVE | Noted: 2020-09-08

## 2020-09-29 ENCOUNTER — APPOINTMENT (OUTPATIENT)
Dept: SPINE | Facility: CLINIC | Age: 40
End: 2020-09-29
Payer: COMMERCIAL

## 2020-09-29 VITALS
HEART RATE: 74 BPM | HEIGHT: 66 IN | BODY MASS INDEX: 24.11 KG/M2 | DIASTOLIC BLOOD PRESSURE: 70 MMHG | TEMPERATURE: 98.4 F | OXYGEN SATURATION: 96 % | SYSTOLIC BLOOD PRESSURE: 103 MMHG | WEIGHT: 150 LBS

## 2020-09-29 DIAGNOSIS — G89.18 OTHER ACUTE POSTPROCEDURAL PAIN: ICD-10-CM

## 2020-09-29 PROCEDURE — 99024 POSTOP FOLLOW-UP VISIT: CPT

## 2020-09-29 RX ORDER — GABAPENTIN 300 MG/1
300 CAPSULE ORAL
Refills: 0 | Status: DISCONTINUED | COMMUNITY
End: 2020-09-29

## 2020-09-29 RX ORDER — OXYCODONE HYDROCHLORIDE 30 MG/1
TABLET ORAL
Refills: 0 | Status: DISCONTINUED | COMMUNITY
End: 2020-09-29

## 2020-09-29 RX ORDER — PREDNISONE 20 MG/1
20 TABLET ORAL
Qty: 90 | Refills: 0 | Status: DISCONTINUED | COMMUNITY
Start: 2017-08-09 | End: 2020-09-29

## 2020-09-29 RX ORDER — CYCLOBENZAPRINE HYDROCHLORIDE 7.5 MG/1
TABLET, FILM COATED ORAL
Refills: 0 | Status: ACTIVE | COMMUNITY

## 2020-09-29 RX ORDER — TIZANIDINE 4 MG/1
4 TABLET ORAL
Qty: 60 | Refills: 0 | Status: DISCONTINUED | COMMUNITY
Start: 2020-08-26 | End: 2020-09-29

## 2020-09-29 NOTE — REASON FOR VISIT
[de-identified] : S/PLeft L4L5 hemilaminotomy.\par  Left L4L5 micro diskectomy.\par  Left L4L5 partial medial facetectomy.\par \par  [de-identified] : 8/31/20 [de-identified] : Today he reports that continues to improve\par \par \par LAST VISIT\par Here today for removal of staples\par Postop incision with approximated wound edges no signs of infections noted.\par Ambulating independently without a cane\par Reports improved pain syndrome

## 2020-09-29 NOTE — ASSESSMENT
[FreeTextEntry1] : 40-year-old man spelt status post lumbar laminectomy discectomy\par \par Doing well\par \par Seen and evaluated by Dr. Cardoza\par \par \par Follow-up in 3 months

## 2020-09-29 NOTE — PHYSICAL EXAM
[General Appearance - Alert] : alert [General Appearance - In No Acute Distress] : in no acute distress [Longitudinal] : longitudinal [Staple Intact] : closed with intact staples [No Drainage] : without drainage [Normal Skin] : normal [Cranial Nerves Optic (II)] : visual acuity intact bilaterally,  pupils equal round and reactive to light [Cranial Nerves Oculomotor (III)] : extraocular motion intact [Cranial Nerves Trigeminal (V)] : facial sensation intact symmetrically [Cranial Nerves Facial (VII)] : face symmetrical [Cranial Nerves Vestibulocochlear (VIII)] : hearing was intact bilaterally [Cranial Nerves Glossopharyngeal (IX)] : tongue and palate midline [Cranial Nerves Accessory (XI - Cranial And Spinal)] : head turning and shoulder shrug symmetric [Cranial Nerves Hypoglossal (XII)] : there was no tongue deviation with protrusion [Motor Tone] : muscle tone was normal in all four extremities [Motor Strength] : muscle strength was normal in all four extremities [Involuntary Movements] : no involuntary movements were seen [Sensation Tactile Decrease] : light touch was intact [Abnormal Walk] : normal gait [Balance] : balance was intact [Sclera] : the sclera and conjunctiva were normal [] : no respiratory distress [Respiration, Rhythm And Depth] : normal respiratory rhythm and effort [FreeTextEntry1] : Ambulates with cane for balance [Skin Color & Pigmentation] : normal skin color and pigmentation

## 2020-12-23 ENCOUNTER — APPOINTMENT (OUTPATIENT)
Dept: SPINE | Facility: CLINIC | Age: 40
End: 2020-12-23
Payer: COMMERCIAL

## 2020-12-23 VITALS
SYSTOLIC BLOOD PRESSURE: 124 MMHG | OXYGEN SATURATION: 91 % | DIASTOLIC BLOOD PRESSURE: 85 MMHG | TEMPERATURE: 98.7 F | HEART RATE: 88 BPM

## 2020-12-23 PROCEDURE — 99213 OFFICE O/P EST LOW 20 MIN: CPT

## 2020-12-23 PROCEDURE — 99072 ADDL SUPL MATRL&STAF TM PHE: CPT

## 2020-12-23 RX ORDER — METHYLPREDNISOLONE 4 MG/1
4 TABLET ORAL
Qty: 1 | Refills: 1 | Status: ACTIVE | COMMUNITY
Start: 2020-12-23 | End: 1900-01-01

## 2020-12-23 NOTE — ASSESSMENT
[FreeTextEntry1] : 40-year-old man status post lumbar discectomy L4-L5 no presenting with recurrent herniated lumbar disc\par \par No immediate surgery recommended at this time\par \par patient will begin Medrol Dosepak\par Medication Purpose, Action, Possible interactions, Side effects as well as adverse effects explain to pt.\par Teachback Protocol implemented\par \par continued BLT precaution\par \par followup in 3 weeks

## 2020-12-23 NOTE — REASON FOR VISIT
[Follow-Up: _____] : a [unfilled] follow-up visit [FreeTextEntry1] : today patient is being evaluated for a recombinant herniated lumbar disc.\par He reports that he was lifting a turkey out of his oven and felt a sharp pain in his lowerback. he followed up with his neurologist and a new MRI of the lumbar spine revealed evidence of large left paracentral and left-sided extruded disc herniation measuring 11.9 x 10 mm in size which causes significant compression upon the left-sided thecal sac and causes compression deviation left L5 nerve root.\par \par Today patient reports back he is experiencing pain rated 7/10 and some difficulty walking. He denies any new motor deficit in his leg. he denies any bowel and bladder dysfunction\par

## 2020-12-23 NOTE — PHYSICAL EXAM
[General Appearance - Alert] : alert [General Appearance - In No Acute Distress] : in no acute distress [Oriented To Time, Place, And Person] : oriented to person, place, and time [Impaired Insight] : insight and judgment were intact [Cranial Nerves Optic (II)] : visual acuity intact bilaterally,  pupils equal round and reactive to light [Cranial Nerves Oculomotor (III)] : extraocular motion intact [Cranial Nerves Trigeminal (V)] : facial sensation intact symmetrically [Cranial Nerves Facial (VII)] : face symmetrical [Cranial Nerves Vestibulocochlear (VIII)] : hearing was intact bilaterally [Cranial Nerves Glossopharyngeal (IX)] : tongue and palate midline [Cranial Nerves Accessory (XI - Cranial And Spinal)] : head turning and shoulder shrug symmetric [Cranial Nerves Hypoglossal (XII)] : there was no tongue deviation with protrusion [Motor Tone] : muscle tone was normal in all four extremities [Motor Strength] : muscle strength was normal in all four extremities [] : no respiratory distress [Abdomen Soft] : soft [Abnormal Walk] : normal gait [Skin Color & Pigmentation] : normal skin color and pigmentation

## 2021-01-19 ENCOUNTER — APPOINTMENT (OUTPATIENT)
Dept: SPINE | Facility: CLINIC | Age: 41
End: 2021-01-19
Payer: COMMERCIAL

## 2021-01-19 VITALS
HEART RATE: 71 BPM | HEIGHT: 66 IN | BODY MASS INDEX: 24.11 KG/M2 | SYSTOLIC BLOOD PRESSURE: 119 MMHG | WEIGHT: 150 LBS | DIASTOLIC BLOOD PRESSURE: 82 MMHG | OXYGEN SATURATION: 94 % | TEMPERATURE: 98.2 F

## 2021-01-19 DIAGNOSIS — M51.26 OTHER INTERVERTEBRAL DISC DISPLACEMENT, LUMBAR REGION: ICD-10-CM

## 2021-01-19 PROCEDURE — 99213 OFFICE O/P EST LOW 20 MIN: CPT

## 2021-01-19 PROCEDURE — 99072 ADDL SUPL MATRL&STAF TM PHE: CPT

## 2021-01-19 NOTE — REASON FOR VISIT
[Follow-Up: _____] : a [unfilled] follow-up visit [FreeTextEntry1] : Doing much better since last visit\par   S/P Lumbar diskectomy

## 2021-01-19 NOTE — PHYSICAL EXAM
[General Appearance - Alert] : alert [General Appearance - In No Acute Distress] : in no acute distress [Cranial Nerves Optic (II)] : visual acuity intact bilaterally,  pupils equal round and reactive to light [Cranial Nerves Oculomotor (III)] : extraocular motion intact [Cranial Nerves Trigeminal (V)] : facial sensation intact symmetrically [Cranial Nerves Facial (VII)] : face symmetrical [Cranial Nerves Vestibulocochlear (VIII)] : hearing was intact bilaterally [Cranial Nerves Glossopharyngeal (IX)] : tongue and palate midline [Cranial Nerves Accessory (XI - Cranial And Spinal)] : head turning and shoulder shrug symmetric [Cranial Nerves Hypoglossal (XII)] : there was no tongue deviation with protrusion [Motor Tone] : muscle tone was normal in all four extremities [Motor Strength] : muscle strength was normal in all four extremities [Involuntary Movements] : no involuntary movements were seen [Sensation Tactile Decrease] : light touch was intact [Abnormal Walk] : normal gait [Balance] : balance was intact [] : no respiratory distress [Respiration, Rhythm And Depth] : normal respiratory rhythm and effort [Skin Color & Pigmentation] : normal skin color and pigmentation [FreeTextEntry1] : Ambulates with cane for balance

## 2021-01-19 NOTE — ASSESSMENT
[FreeTextEntry1] : S/P Lumbar Diskectomy\par \par Continue conservative management\par \par \par Follow up as needed

## 2021-04-15 NOTE — PRE-ANESTHESIA EVALUATION ADULT - NSANTHOBSERVEDRD_ENT_A_CORE
No Female Completion Statement: After discussing her treatment course we decided to discontinue isotretinoin therapy at this time. I explained that she would need to continue her birth control methods for at least one month after the last dosage. She should also get a pregnancy test one month after the last dose. She shouldn't donate blood for one month after the last dose. She should call with any new symptoms of depression.

## 2021-04-22 ENCOUNTER — OUTPATIENT (OUTPATIENT)
Dept: OUTPATIENT SERVICES | Facility: HOSPITAL | Age: 41
LOS: 1 days | End: 2021-04-22
Payer: COMMERCIAL

## 2021-04-22 ENCOUNTER — APPOINTMENT (OUTPATIENT)
Dept: RADIOLOGY | Facility: CLINIC | Age: 41
End: 2021-04-22
Payer: COMMERCIAL

## 2021-04-22 DIAGNOSIS — Z00.8 ENCOUNTER FOR OTHER GENERAL EXAMINATION: ICD-10-CM

## 2021-04-22 DIAGNOSIS — Z98.890 OTHER SPECIFIED POSTPROCEDURAL STATES: Chronic | ICD-10-CM

## 2021-04-22 PROCEDURE — 72110 X-RAY EXAM L-2 SPINE 4/>VWS: CPT

## 2021-04-22 PROCEDURE — 72110 X-RAY EXAM L-2 SPINE 4/>VWS: CPT | Mod: 26

## 2021-05-31 NOTE — PATIENT PROFILE ADULT - FLU SEASON?
Patient was signed out to me by the outgoing physician,     Nursing notes reviewed.    Results for orders placed or performed during the hospital encounter of 05/31/21   Comprehensive Metabolic Panel   Result Value    Fasting Status     Sodium 141    Potassium 4.7    Chloride 103    Carbon Dioxide 32    Anion Gap 11    Glucose 118 (H)    BUN 53 (H)    Creatinine 5.51 (H)    Glomerular Filtration Rate 10 (L)     Comment: eGFR <15 mL/min/1.73m2 = Kidney failure or Stage 5 CKD (chronic kidney disease).    BUN/ Creatinine Ratio 10    Calcium 9.0    Bilirubin, Total 0.5    GOT/AST 14    GPT/ALT 23    Alkaline Phosphatase 75    Albumin 3.5 (L)    Protein, Total 6.8    Globulin 3.3    A/G Ratio 1.1   Prothrombin Time   Result Value    Prothrombin Time 11.3    INR 1.1     Comment: INR Therapeutic Range: 2.0 to 3.0 (2.5 to 3.5 recommended for recurrent thrombotic episodes and mechanical prosthetic heart valves.)   Partial Thromboplastin Time   Result Value    PTT 28   CBC with Automated Differential (performable only)   Result Value    WBC 6.6    RBC 4.06 (L)    HGB 12.3 (L)    HCT 39.1    MCV 96.3    MCH 30.3    MCHC 31.5 (L)    RDW-CV 13.2    RDW-SD 47.4        NRBC 0    Neutrophil, Percent 59    Lymphocytes, Percent 22    Mono, Percent 11    Eosinophils, Percent 6    Basophils, Percent 1    Immature Granulocytes 1    Absolute Neutrophils 4.1    Absolute Lymphocytes 1.4    Absolute Monocytes 0.7    Absolute Eosinophils  0.4    Absolute Basophils 0.0    Absolute Immmature Granulocytes 0.0       Imaging Results          US VASC EXTREMITY UPPER VENOUS DUPLEX (Final result)  Result time 05/31/21 11:57:15    Final result                 Impression:    1.    DVT involving the right subclavian and axillary veins and extending  into cephalic and basilic veins.    Electronically Signed by: GIANCARLO IZAGUIRRE M.D.   Signed on: 5/31/2021 11:57 AM                Narrative:    EXAM:   US VASC EXTREMITY UPPER VENOUS DUPLEX  RIGHT    CLINICAL INDICATION: Right upper extremity swelling     COMPARISON:  No previous exams available for review.    TECHNIQUE:  Multiple sonographic images of the deep veins of the right  upper extremity with duplex evaluation including color flow, spectral and  waveform analysis.    FINDINGS:  The right jugular vein is widely patent with normal color flow  and phasicity.  Catheter is noted within imaged portion of the jugular  vein.      The subclavian vein is filled with echogenic material and is  noncompressible without identifiable color flow.  This extends laterally to  involve the axillary vein and basilic and cephalic veins.                                  Vitals:    05/31/21 0947 05/31/21 1000   BP: 139/74 138/72   BP Location: RUE - Right upper extremity    Patient Position: Sitting    Pulse: 66    Resp: 16    Temp: 97.7 °F (36.5 °C)    TempSrc: Temporal    SpO2: 98% 98%   Height: 5' 10\" (1.778 m)        ED Course as of May 31 1309   Mon May 31, 2021   1115 Labs unremarkable consistent with his chronic renal failure.  He signed over to Dr. Vu for ultrasound result.  Discussed home if negative and follow-up closely.  Will need anticoagulation if abnormal.  Discussed these details with patient he is comfortable with the plan    [RR]   1309 The patient's ultrasound of the right upper extremity demonstrates evidence of DVT.  Given the patient's complicated medical history including renal failure-the patient was placed on IV heparin.  The case discussed with nephrology, hematology as well as Dr. Bell.  He will be admitted to the hospital.    [SK]      ED Course User Index  [RR] Cristian Javed DO  [SK] Shabbir A Kanji, MD        Medications   heparin (porcine) injection 80 Units/kg (Dosing Weight) (has no administration in time range)   heparin (porcine) 25,000 units/250 mL in dextrose 5 % infusion (has no administration in time range)   heparin (porcine) injection 80 Units/kg (Dosing Weight) (has no  administration in time range)   heparin (porcine) injection 40 Units/kg (Dosing Weight) (has no administration in time range)        ED Diagnoses        Final diagnoses    Arm swelling          Arm DVT (deep venous thromboembolism), acute, right (CMS/[facility])                          Shabbir A Kanji, MD  05/31/21 1869     No

## 2024-05-03 ENCOUNTER — APPOINTMENT (OUTPATIENT)
Dept: PULMONOLOGY | Facility: CLINIC | Age: 44
End: 2024-05-03
Payer: COMMERCIAL

## 2024-05-03 VITALS
RESPIRATION RATE: 16 BRPM | HEIGHT: 66 IN | OXYGEN SATURATION: 98 % | HEART RATE: 73 BPM | SYSTOLIC BLOOD PRESSURE: 118 MMHG | WEIGHT: 156 LBS | TEMPERATURE: 96.6 F | BODY MASS INDEX: 25.07 KG/M2 | DIASTOLIC BLOOD PRESSURE: 80 MMHG

## 2024-05-03 DIAGNOSIS — J45.30 MILD PERSISTENT ASTHMA, UNCOMPLICATED: ICD-10-CM

## 2024-05-03 DIAGNOSIS — Z87.19 PERSONAL HISTORY OF OTHER DISEASES OF THE DIGESTIVE SYSTEM: ICD-10-CM

## 2024-05-03 DIAGNOSIS — Z72.820 SLEEP DEPRIVATION: ICD-10-CM

## 2024-05-03 DIAGNOSIS — J30.89 OTHER ALLERGIC RHINITIS: ICD-10-CM

## 2024-05-03 DIAGNOSIS — M51.9 UNSPECIFIED THORACIC, THORACOLUMBAR AND LUMBOSACRAL INTERVERTEBRAL DISC DISORDER: ICD-10-CM

## 2024-05-03 DIAGNOSIS — K21.9 GASTRO-ESOPHAGEAL REFLUX DISEASE W/OUT ESOPHAGITIS: ICD-10-CM

## 2024-05-03 DIAGNOSIS — R06.02 SHORTNESS OF BREATH: ICD-10-CM

## 2024-05-03 DIAGNOSIS — U07.1 COVID-19: ICD-10-CM

## 2024-05-03 DIAGNOSIS — R06.83 SNORING: ICD-10-CM

## 2024-05-03 DIAGNOSIS — Z80.8 FAMILY HISTORY OF MALIGNANT NEOPLASM OF OTHER ORGANS OR SYSTEMS: ICD-10-CM

## 2024-05-03 DIAGNOSIS — Z86.16 PERSONAL HISTORY OF COVID-19: ICD-10-CM

## 2024-05-03 DIAGNOSIS — J45.902 UNSPECIFIED ASTHMA WITH STATUS ASTHMATICUS: ICD-10-CM

## 2024-05-03 DIAGNOSIS — Z86.03 PERSONAL HISTORY OF NEOPLASM OF UNCERTAIN BEHAVIOR: ICD-10-CM

## 2024-05-03 DIAGNOSIS — M54.16 RADICULOPATHY, LUMBAR REGION: ICD-10-CM

## 2024-05-03 PROCEDURE — 99204 OFFICE O/P NEW MOD 45 MIN: CPT | Mod: 25

## 2024-05-03 PROCEDURE — 94729 DIFFUSING CAPACITY: CPT

## 2024-05-03 PROCEDURE — 94060 EVALUATION OF WHEEZING: CPT

## 2024-05-03 PROCEDURE — 94618 PULMONARY STRESS TESTING: CPT

## 2024-05-03 PROCEDURE — 71046 X-RAY EXAM CHEST 2 VIEWS: CPT

## 2024-05-03 PROCEDURE — 94727 GAS DIL/WSHOT DETER LNG VOL: CPT

## 2024-05-03 PROCEDURE — ZZZZZ: CPT

## 2024-05-03 RX ORDER — FLUTICASONE FUROATE AND VILANTEROL TRIFENATATE 200; 25 UG/1; UG/1
200-25 POWDER RESPIRATORY (INHALATION)
Qty: 3 | Refills: 1 | Status: ACTIVE | COMMUNITY
Start: 2024-05-03 | End: 1900-01-01

## 2024-05-03 RX ORDER — OLOPATADINE HYDROCHLORIDE 665 UG/1
0.6 SPRAY, METERED NASAL
Qty: 1 | Refills: 3 | Status: ACTIVE | COMMUNITY
Start: 2024-05-03 | End: 1900-01-01

## 2024-05-03 NOTE — PROCEDURE
[FreeTextEntry1] : CXR (05/03/2024) reveals a normal sized heart; no evidence of infiltrate or effusion--a normal appearing chest radiograph  Full PFT reveals normal flows; FEV1 was 4.25 L which is 119 % of predicted, with no change with BD; normal lung volumes; normal diffusion at 5.3, which is 102% of predicted; normal flow volume loop. PFTs were performed to evaluate for SOB  6 minute walk test reveals a low saturation of 95% with no evidence of dyspnea or fatigue; walked 521.6 meters  FENO was 33; a normal value being less than 25 Fractional exhaled nitric oxide (FENO) is regarded as a simple, noninvasive method for assessing eosinophilic airway inflammation. Produced by a variety of cells within the lung, nitric oxide (NO) concentrations are generally low in healthy individuals. However, high concentrations of NO appear to be involved in nonspecific host defense mechanisms and chronic inflammatory diseases such as asthma. The American Thoracic Society (ATS) therefore has recommended using FENO to aid in the diagnosis and monitoring of eosinophilic airway inflammation and asthma, and for identifying steroid responsive individuals whose chronic respiratory symptoms may be caused by airway inflammation.

## 2024-05-03 NOTE — PHYSICAL EXAM

## 2024-05-03 NOTE — HISTORY OF PRESENT ILLNESS
[TextBox_4] : Mr. TALBOT is a 44 year old male with a history of IgA neuropathy, Lumbar disc disease, lumbar radiculopathy, COVID-19x2, ?Asthma, Eczema presenting to the office today for an initial pulmonary evaluation for SOB, chronic cough, mild persistent asthma (post-COVID), remote GERD, and poor sleep ?ZACKERY. His chief complaint is  -he notes childhood asthma -he notes SOB -he notes childhood asthma -he notes post-nasal drip -he notes active sinuses - environmental -he notes allergies are seasonal and environmental - congestion, itchy eyes, eczema -he notes allergies as well to animal dander (especially dogs) -he notes no heartburn/reflux recently (in 20's experienced) -he denies dysphonia  .sleept 6-8 hours of restful sleep -he notes intermitten nocturia (3-4 times per week) -he notes ability to fall asleep while watching a boring TV show and while in a moving car  -he notes neck size <16 -he notes his memory and concentration are - worse recently  -he notes energy levels are (6-7/10) -he notes vision is stable  -he notes exercising  (walks and resistance bands)     -he denies any headaches, nausea, emesis, fever, chills, sweats, chest pain, chest pressure, coughing, wheezing, palpitations, diarrhea, constipation, dysphagia, vertigo, arthralgias, myalgias, leg swelling, itchy eyes, itchy ears, heartburn, reflux, or sour taste in the mouth.

## 2024-05-03 NOTE — REASON FOR VISIT
[Initial] : an initial visit [TextBox_44] : SOB, chronic cough, mild persistent asthma (post-COVID), remote GERD, and poor sleep ?ZACKERY

## 2024-05-03 NOTE — ADDENDUM
[FreeTextEntry1] : Documented by Donald Prince acting as a scribe for Dr. Reymundo Waldrop on 05/03/2024 All medical record entries made by the Scribe were at my, Dr. Reymundo Waldrop's, direction and personally dictated by me on 05/03/2024 . I have reviewed the chart and agree that the record accurately reflects my personal performance of the history, physical exam, assessment and plan. I have also personally directed, reviewed, and agree with the discharge instructions.

## 2024-05-03 NOTE — ASSESSMENT
[FreeTextEntry1] : Mr. TALBOT is a 44 year old male with a history of IgA neuropathy, Lumbar disc disease, lumbar radiculopathy, COVID-19x2, ?Asthma, Eczema presenting to the office today for an initial pulmonary evaluation for SOB, chronic cough, mild persistent asthma (post-COVID), remote GERD, and poor sleep ?ZACKERY    His shortness of breath is multifactorial due to: -poor mechanics of breathing -out of shape -pulmonary disease   -mild persistent asthma (childhood and post-COVID)    Problem 1: mild persistent asthma (childhood and post-COVID) -add Breo Ellipta 200 at 1 inhalation QD -Asthma is believed to be caused by inherited (genetic) and environmental factor, but its exact cause is unknown. Asthma may be triggered by allergens, lung infections, or irritants in the air. Asthma triggers are different for each person. -Inhaler technique reviewed as well as oral hygiene techniques reviewed with patient. Avoidance of cold air, extremes of temperature, rescue inhaler should be used before exercise. Order of medication reviewed with patient. Recommended adequate hydration and use of a cool mist humidifier in the bedroom   Problem 2: allergies/sinus -add Olopatadine 0.6% 1 sniff BID -complete blood work: asthma panel, food IgE panel, IgE level, eosinophil level, vitamin D level -Environmental measures for allergies were encouraged including mattress and pillow covers, air purifier, and environmental controls.   Problem 3: remote GERD -Rule of 2s: avoid eating too much, eating too late, eating too spicy, eating two hours before bed. -Things to avoid including overeating, spicy foods, tight clothing, eating within three hours of bed, this list is not all inclusive. -For treatment of reflux, possible options discussed including diet control, H2 blockers, PPIs, as well as coating motility agents discussed as treatment options. Timing of meals and proximity of last meal to sleep were discussed. If symptoms persist, a formal gastrointestinal evaluation is needed.     Problem 4: ?ZACKERY (elevated Mallampati class, poor quality sleep, snoring) -complete home sleep study -Sleep apnea is associated with adverse clinical consequences which can affect most organ systems. Cardiovascular disease risk includes arrhythmias, atrial fibrillation, hypertension, coronary artery disease, and stroke. Metabolic disorders include diabetes type 2, non-alcoholic fatty liver disease. Mood disorder especially depression; and cognitive decline especially in the elderly. Associations with chronic reflux/Mcmanus's esophagus some but not all inclusive. -Reasons include arousal consistent with hypopnea; respiratory events most prominent in REM sleep or supine position; therefore sleep staging and body position are important for accurate diagnosis and estimation of AHI.    Problem 6: poor breathing mechanics -Recommended Dmitry Vieira breathing technique -Proper breathing techniques were reviewed with an emphasis on exhalation. Patient was instructed to breathe in for 1 second and out for 4 seconds. The patient was encouraged to not talk while walking.     Problem 7: out of shape -exercise, and diet control were discussed and are highly encouraged. Treatment options are given such as aqua therapy, and contacting a nutritionist. Recommended to use the elliptical, stationary bike, less use of the treadmill.     Problem 8: health maintenance -recommended yearly flu shot after October 15, 2023 -recommended strep pneumonia vaccines: Prevnar-20 vaccine -recommended early intervention for Upper Respiratory Infections (URIs) -recommended regular osteoporosis evaluations -recommended early dermatological evaluations -recommended after the age of 50 to the age of 70, colonoscopy every 5 years     F/P in 6-8 weeks. He is encouraged to call with any changes, concerns, or questions

## 2024-07-12 ENCOUNTER — TRANSCRIPTION ENCOUNTER (OUTPATIENT)
Age: 44
End: 2024-07-12

## 2024-07-12 ENCOUNTER — APPOINTMENT (OUTPATIENT)
Dept: PULMONOLOGY | Facility: CLINIC | Age: 44
End: 2024-07-12
Payer: COMMERCIAL

## 2024-07-12 VITALS
TEMPERATURE: 97.3 F | HEART RATE: 77 BPM | RESPIRATION RATE: 16 BRPM | HEIGHT: 66 IN | OXYGEN SATURATION: 97 % | DIASTOLIC BLOOD PRESSURE: 76 MMHG | SYSTOLIC BLOOD PRESSURE: 110 MMHG | BODY MASS INDEX: 24.59 KG/M2 | WEIGHT: 153 LBS

## 2024-07-12 DIAGNOSIS — J45.30 MILD PERSISTENT ASTHMA, UNCOMPLICATED: ICD-10-CM

## 2024-07-12 DIAGNOSIS — Z72.820 SLEEP DEPRIVATION: ICD-10-CM

## 2024-07-12 DIAGNOSIS — R09.82 POSTNASAL DRIP: ICD-10-CM

## 2024-07-12 DIAGNOSIS — R05.3 CHRONIC COUGH: ICD-10-CM

## 2024-07-12 PROCEDURE — 94010 BREATHING CAPACITY TEST: CPT

## 2024-07-12 PROCEDURE — 95012 NITRIC OXIDE EXP GAS DETER: CPT

## 2024-07-12 PROCEDURE — 99214 OFFICE O/P EST MOD 30 MIN: CPT | Mod: 25

## 2024-10-09 ENCOUNTER — APPOINTMENT (OUTPATIENT)
Dept: CT IMAGING | Facility: CLINIC | Age: 44
End: 2024-10-09
Payer: COMMERCIAL

## 2024-10-09 ENCOUNTER — OUTPATIENT (OUTPATIENT)
Dept: OUTPATIENT SERVICES | Facility: HOSPITAL | Age: 44
LOS: 1 days | End: 2024-10-09
Payer: COMMERCIAL

## 2024-10-09 DIAGNOSIS — Z98.890 OTHER SPECIFIED POSTPROCEDURAL STATES: Chronic | ICD-10-CM

## 2024-10-09 DIAGNOSIS — R05.3 CHRONIC COUGH: ICD-10-CM

## 2024-10-09 PROCEDURE — 71250 CT THORAX DX C-: CPT

## 2024-10-09 PROCEDURE — 71250 CT THORAX DX C-: CPT | Mod: 26

## 2024-10-16 ENCOUNTER — NON-APPOINTMENT (OUTPATIENT)
Age: 44
End: 2024-10-16

## 2024-10-17 ENCOUNTER — NON-APPOINTMENT (OUTPATIENT)
Age: 44
End: 2024-10-17

## 2024-10-17 DIAGNOSIS — T17.500A UNSPECIFIED FOREIGN BODY IN BRONCHUS CAUSING ASPHYXIATION, INITIAL ENCOUNTER: ICD-10-CM

## 2025-01-06 NOTE — PATIENT PROFILE ADULT - CENTRAL VENOUS CATHETER/PICC LINE
Follow up with your physician in 3-4 days if not improving, sooner with concerns.    If not tolerating fluids by mouth and not urinating at least once every 10-12 hours (2-3 times in 24 hours) we recommend evaluation in the ER.    There currently are known cases of NOROVIRUS within the community and state.  This is a very contagious virus and cleaning must include bleach, hydrogen peroxide , or some alcohol based  (like Purell Surface Disinfectant). Standard Clorox wipes, \"disinfecting or antibacterial wipes\", and Lysol DO NOT kill this virus.      This virus remains contagious for 48 hours after symptoms resolve - patients can shed the virus in the stool for up to several weeks in some cases.     Hand sanitizers do not kill norovirus - only hand washing with soap and water with warm/hot water for over 20 seconds has been proven effective.    no

## 2025-01-10 ENCOUNTER — APPOINTMENT (OUTPATIENT)
Dept: PULMONOLOGY | Facility: CLINIC | Age: 45
End: 2025-01-10
Payer: COMMERCIAL

## 2025-01-10 VITALS
DIASTOLIC BLOOD PRESSURE: 70 MMHG | RESPIRATION RATE: 16 BRPM | WEIGHT: 161 LBS | HEART RATE: 80 BPM | TEMPERATURE: 97.88 F | SYSTOLIC BLOOD PRESSURE: 100 MMHG | BODY MASS INDEX: 25.88 KG/M2 | OXYGEN SATURATION: 97 % | HEIGHT: 66 IN

## 2025-01-10 DIAGNOSIS — K21.9 GASTRO-ESOPHAGEAL REFLUX DISEASE W/OUT ESOPHAGITIS: ICD-10-CM

## 2025-01-10 DIAGNOSIS — R06.02 SHORTNESS OF BREATH: ICD-10-CM

## 2025-01-10 DIAGNOSIS — R06.83 SNORING: ICD-10-CM

## 2025-01-10 DIAGNOSIS — J30.89 OTHER ALLERGIC RHINITIS: ICD-10-CM

## 2025-01-10 DIAGNOSIS — J45.30 MILD PERSISTENT ASTHMA, UNCOMPLICATED: ICD-10-CM

## 2025-01-10 PROCEDURE — 94010 BREATHING CAPACITY TEST: CPT

## 2025-01-10 PROCEDURE — 95012 NITRIC OXIDE EXP GAS DETER: CPT

## 2025-01-10 PROCEDURE — 99214 OFFICE O/P EST MOD 30 MIN: CPT | Mod: 25

## 2025-01-10 RX ORDER — BUDESONIDE AND FORMOTEROL FUMARATE DIHYDRATE 160; 4.5 UG/1; UG/1
160-4.5 AEROSOL RESPIRATORY (INHALATION) TWICE DAILY
Qty: 3 | Refills: 1 | Status: ACTIVE | COMMUNITY
Start: 2025-01-10 | End: 1900-01-01

## 2025-04-02 ENCOUNTER — NON-APPOINTMENT (OUTPATIENT)
Age: 45
End: 2025-04-02

## 2025-07-06 ENCOUNTER — NON-APPOINTMENT (OUTPATIENT)
Age: 45
End: 2025-07-06

## 2025-07-11 ENCOUNTER — APPOINTMENT (OUTPATIENT)
Dept: PULMONOLOGY | Facility: CLINIC | Age: 45
End: 2025-07-11

## 2025-09-02 ENCOUNTER — NON-APPOINTMENT (OUTPATIENT)
Age: 45
End: 2025-09-02

## 2025-09-03 ENCOUNTER — APPOINTMENT (OUTPATIENT)
Dept: ORTHOPEDIC SURGERY | Facility: CLINIC | Age: 45
End: 2025-09-03
Payer: COMMERCIAL

## 2025-09-03 VITALS — BODY MASS INDEX: 25.71 KG/M2 | HEIGHT: 66 IN | WEIGHT: 160 LBS

## 2025-09-03 DIAGNOSIS — M54.12 RADICULOPATHY, CERVICAL REGION: ICD-10-CM

## 2025-09-03 PROCEDURE — 72040 X-RAY EXAM NECK SPINE 2-3 VW: CPT

## 2025-09-03 PROCEDURE — 99203 OFFICE O/P NEW LOW 30 MIN: CPT

## 2025-09-03 RX ORDER — METHYLPREDNISOLONE 4 MG/1
4 TABLET ORAL
Qty: 21 | Refills: 0 | Status: ACTIVE | COMMUNITY
Start: 2025-09-03 | End: 1900-01-01

## 2025-09-10 ENCOUNTER — APPOINTMENT (OUTPATIENT)
Dept: ORTHOPEDIC SURGERY | Facility: CLINIC | Age: 45
End: 2025-09-10